# Patient Record
Sex: FEMALE | Race: WHITE | Employment: FULL TIME | URBAN - METROPOLITAN AREA
[De-identification: names, ages, dates, MRNs, and addresses within clinical notes are randomized per-mention and may not be internally consistent; named-entity substitution may affect disease eponyms.]

---

## 2017-01-01 ENCOUNTER — SOCIAL WORK SERVICES (OUTPATIENT)
Dept: HEMATOLOGY/ONCOLOGY | Facility: HOSPITAL | Age: 52
End: 2017-01-01

## 2017-01-01 ENCOUNTER — TELEPHONE (OUTPATIENT)
Dept: HEMATOLOGY/ONCOLOGY | Facility: HOSPITAL | Age: 52
End: 2017-01-01

## 2017-01-01 ENCOUNTER — OFFICE VISIT (OUTPATIENT)
Dept: HEMATOLOGY/ONCOLOGY | Facility: HOSPITAL | Age: 52
End: 2017-01-01
Attending: INTERNAL MEDICINE
Payer: COMMERCIAL

## 2017-01-01 ENCOUNTER — NURSE NAVIGATOR ENCOUNTER (OUTPATIENT)
Dept: HEMATOLOGY/ONCOLOGY | Facility: HOSPITAL | Age: 52
End: 2017-01-01

## 2017-01-01 VITALS
HEART RATE: 87 BPM | RESPIRATION RATE: 18 BRPM | WEIGHT: 120 LBS | HEIGHT: 65 IN | TEMPERATURE: 98 F | OXYGEN SATURATION: 98 % | SYSTOLIC BLOOD PRESSURE: 140 MMHG | BODY MASS INDEX: 19.99 KG/M2 | DIASTOLIC BLOOD PRESSURE: 88 MMHG

## 2017-01-01 VITALS
WEIGHT: 122.5 LBS | OXYGEN SATURATION: 96 % | BODY MASS INDEX: 20.41 KG/M2 | HEIGHT: 65 IN | RESPIRATION RATE: 18 BRPM | TEMPERATURE: 99 F | SYSTOLIC BLOOD PRESSURE: 135 MMHG | DIASTOLIC BLOOD PRESSURE: 80 MMHG | HEART RATE: 86 BPM

## 2017-01-01 VITALS
DIASTOLIC BLOOD PRESSURE: 82 MMHG | TEMPERATURE: 98 F | HEIGHT: 65 IN | RESPIRATION RATE: 18 BRPM | SYSTOLIC BLOOD PRESSURE: 127 MMHG | HEART RATE: 117 BPM | OXYGEN SATURATION: 99 % | WEIGHT: 120.81 LBS | BODY MASS INDEX: 20.13 KG/M2

## 2017-01-01 DIAGNOSIS — K59.09 OTHER CONSTIPATION: ICD-10-CM

## 2017-01-01 DIAGNOSIS — C79.51 BONE METASTASES (HCC): ICD-10-CM

## 2017-01-01 DIAGNOSIS — E83.52 HYPERCALCEMIA: ICD-10-CM

## 2017-01-01 DIAGNOSIS — Z71.9 ENCOUNTER FOR HEALTH EDUCATION: ICD-10-CM

## 2017-01-01 DIAGNOSIS — C50.919 METASTATIC BREAST CANCER (HCC): Primary | ICD-10-CM

## 2017-01-01 DIAGNOSIS — F41.8 ANXIETY ABOUT HEALTH: ICD-10-CM

## 2017-01-01 DIAGNOSIS — G89.3 CANCER RELATED PAIN: ICD-10-CM

## 2017-01-01 DIAGNOSIS — R74.01 TRANSAMINITIS: ICD-10-CM

## 2017-01-01 PROCEDURE — 99245 OFF/OP CONSLTJ NEW/EST HI 55: CPT | Performed by: INTERNAL MEDICINE

## 2017-01-01 PROCEDURE — 99215 OFFICE O/P EST HI 40 MIN: CPT | Performed by: CLINICAL NURSE SPECIALIST

## 2017-01-01 PROCEDURE — 36591 DRAW BLOOD OFF VENOUS DEVICE: CPT

## 2017-01-01 PROCEDURE — 96372 THER/PROPH/DIAG INJ SC/IM: CPT

## 2017-01-01 PROCEDURE — 99215 OFFICE O/P EST HI 40 MIN: CPT | Performed by: INTERNAL MEDICINE

## 2017-01-01 PROCEDURE — 96402 CHEMO HORMON ANTINEOPL SQ/IM: CPT

## 2017-01-01 RX ORDER — DRONABINOL 2.5 MG/1
2.5 CAPSULE ORAL
Qty: 60 CAPSULE | Refills: 0 | Status: SHIPPED | OUTPATIENT
Start: 2017-01-01 | End: 2018-01-01

## 2017-01-01 RX ORDER — PROCHLORPERAZINE MALEATE 10 MG
10 TABLET ORAL EVERY 6 HOURS PRN
Qty: 30 TABLET | Refills: 3 | Status: SHIPPED | OUTPATIENT
Start: 2017-01-01 | End: 2018-01-01

## 2017-01-01 RX ORDER — ONDANSETRON HYDROCHLORIDE 8 MG/1
8 TABLET, FILM COATED ORAL EVERY 8 HOURS PRN
Qty: 30 TABLET | Refills: 3 | Status: SHIPPED | OUTPATIENT
Start: 2017-01-01 | End: 2018-01-01

## 2017-01-01 RX ORDER — LETROZOLE 2.5 MG/1
2.5 TABLET, FILM COATED ORAL DAILY
Qty: 90 TABLET | Refills: 3 | Status: SHIPPED | OUTPATIENT
Start: 2017-01-01 | End: 2018-01-01

## 2017-01-01 RX ORDER — SODIUM CHLORIDE 0.9 % (FLUSH) 0.9 %
10 SYRINGE (ML) INJECTION ONCE
Status: CANCELLED | OUTPATIENT
Start: 2017-01-01

## 2017-01-01 RX ORDER — DIAZEPAM 2 MG/1
2 TABLET ORAL NIGHTLY PRN
COMMUNITY
End: 2018-01-01

## 2017-01-01 RX ORDER — MAGNESIUM OXIDE 400 MG (241.3 MG MAGNESIUM) TABLET
400 TABLET DAILY
COMMUNITY
End: 2018-01-01

## 2017-01-01 RX ORDER — MORPHINE SULFATE 15 MG/1
7.5 TABLET ORAL 2 TIMES DAILY PRN
COMMUNITY
End: 2018-01-01

## 2017-01-01 RX ORDER — SODIUM CHLORIDE 0.9 % (FLUSH) 0.9 %
10 SYRINGE (ML) INJECTION ONCE
Status: COMPLETED | OUTPATIENT
Start: 2017-01-01 | End: 2017-01-01

## 2017-01-01 RX ADMIN — SODIUM CHLORIDE 0.9 % (FLUSH) 10 ML: 0.9 % SYRINGE (ML) INJECTION at 14:10:00

## 2017-01-01 RX ADMIN — SODIUM CHLORIDE 0.9 % (FLUSH) 10 ML: 0.9 % SYRINGE (ML) INJECTION at 09:40:00

## 2017-12-18 PROBLEM — C50.919 METASTATIC BREAST CANCER (HCC): Status: ACTIVE | Noted: 2017-01-01

## 2017-12-18 PROBLEM — K59.09 OTHER CONSTIPATION: Status: ACTIVE | Noted: 2017-01-01

## 2017-12-18 PROBLEM — G89.3 CANCER RELATED PAIN: Status: ACTIVE | Noted: 2017-01-01

## 2017-12-18 PROBLEM — R74.01 TRANSAMINITIS: Status: ACTIVE | Noted: 2017-01-01

## 2017-12-18 PROBLEM — E83.52 HYPERCALCEMIA: Status: ACTIVE | Noted: 2017-01-01

## 2017-12-18 PROBLEM — C79.51 BONE METASTASES (HCC): Status: ACTIVE | Noted: 2017-01-01

## 2017-12-18 PROBLEM — F41.8 ANXIETY ABOUT HEALTH: Status: ACTIVE | Noted: 2017-01-01

## 2017-12-18 NOTE — PROGRESS NOTES
Hematology/Oncology Initial Consultation Note    Patient Name: Sim Chicas  Medical Record Number: [id number]    YOB: 1965   Date of Consultation: 12/18/2017   PCP: Dr. Tabatha Ricardo    Reason for Consultation:  Sim Chicas was seen today for th evaluation of newly diagnosed breast cancer. She had a recent expedited work up performed at [facility].  Her clinical course is summarized as above.   She reports that her last mammogram was 2.5 years ago, at that time was told she had dense breast that time. No known parathyroid disease.      Past Medical History:  Past Medical History:   Diagnosis Date   • Bone metastases ([facility] [address] 12/18/2017   • Metastatic breast cancer ([facility] 12/18/2017     Past Surgical History:  No date: BREAST LUMPECTOMY      Commen Mónica       Family Medical History:  Family History   Problem Relation Age of Onset   • Cancer Father 79     bladder   • Prostate Cancer Father 79   • polycythemia vera [OTHER] Father 80       Review of Systems:  A 10-point ROS was done with pertinent p 6.7 12/18/2017   ALB 3.1 (L) 12/18/2017    12/18/2017   K 4.0 12/18/2017    12/18/2017   CO2 25.0 12/18/2017     No results found for: PTT, PT, INR    Component      Latest Ref Rng & Units 12/18/2017   BREAST CARCINOMA Ag ([id number])      <38.0 u/ *breast/abdominal pain- due to malignancy  -decrease dexamethasone to 4mg AM, 2mg PM- will continue to work to wean this off over the next couple weeks  -continue morphine 7.5mg IR q4hrs, can increase to 15mg if needed as wean dex  -will plan to have h

## 2017-12-18 NOTE — PATIENT INSTRUCTIONS
For Dr. Nhi Rea nurse line, call [phone] with any questions or concerns,  Monday through Friday 8:00 to 4:30. After hours or weekends for urgent needs: [phone].   Central [phone]  Medical Records:   [phone]  Cancer Cent

## 2017-12-18 NOTE — PROGRESS NOTES
Patient here for initial MD visit. Recommended by our patient [employer]. Patient seen previously at [facility].  On 12/4/17 patient went to ER for pain on right side, states had a high fat meal and thought it was her gallbladder.   Testing showe

## 2017-12-21 NOTE — PROGRESS NOTES
Hematology/Oncology Clinic Follow Up Visit    Patient Name: Brooklyn Faith  Medical Record Number: [id number]    YOB: 1965    PCP: Dr. Carrie Rojo    Reason for Consultation:  Brooklyn Faith was seen today for the diagnosis of metastatic breast canc weaned dexamethasone down to 4mg in AM only from BID. Still taking morphine 7.5mg IR q4hrs on a scheduled basis, too afraid pain would worsen if waited longer though often not in any pain when takes each dose.   Sets alarm during night to wake up and take Alcohol use Yes    Comment: rare use, no hx of excessive use    Drug use: No    Sexual activity: Not on file     Other Topics Concern   None on file     Social History Narrative    Single. Lives alone in [place].   Works as an  at [employer]- on 0.93 12/18/2017   CREATSERUM 0.93 12/18/2017   GFR 72 12/18/2017   CA 10.5 (H) 12/18/2017   CA 11.1 (H) 12/18/2017   ALKPHO 560 (H) 12/18/2017    (H) 12/18/2017    (H) 12/18/2017   BILT 0.7 12/18/2017   TP 6.7 12/18/2017   ALB 3.1 (L) 12/18/2 in pain, otherwise stretch duration between doses as able.     -will plan to have her establish with palliative care [facility] next week    *hypercalcemia  -due to malig/bone dz  -tx as below    *bone metastasis  -start xgeva 120mg SC q4 weeks to reduce skeletal

## 2017-12-21 NOTE — PATIENT INSTRUCTIONS
For Dr. Juana Mensah nurse line, call [phone] with any questions or concerns,  Monday through Friday 8:00 to 4:30. After hours or weekends for urgent needs: [phone].   Central [phone]  Medical Records:   [phone]  Cancer Cent

## 2017-12-21 NOTE — PATIENT INSTRUCTIONS
Education Record    Learner:  Patient and Family Member--MOTHER    Disease / Diagnosis:BREAST CANCER    Barriers / Limitations:  None   Comments:    Method:  Brief focused and Reinforcement   Comments:    General Topics:  Medication, Side effects and sympt

## 2017-12-22 NOTE — PROGRESS NOTES
ORAL CHEMOTHERAPY EDUCATION RECORD  Learner:  Patient and mother     Barriers / Limitations: ADHD, anxiety disorder     Diagnosis:   Metastatic Breast Cancer      Medication Name:  Krystal Aguilar       Dose:      125 mg

## 2017-12-27 NOTE — TELEPHONE ENCOUNTER
Patient requesting that at her appt tomorrow she can see the image of her liver. I do not see any imaging results.

## 2017-12-28 PROBLEM — Z51.5 PALLIATIVE CARE BY SPECIALIST: Status: ACTIVE | Noted: 2017-01-01

## 2017-12-28 NOTE — PROGRESS NOTES
Patient here for MD f/u. States feeling well today. Started palbociclib  Patient will be calling Val Cunningham later re: LEONARDO and medical Eri Biswas. Yolanda Edwards informed.      [facility] our pathol

## 2017-12-28 NOTE — PATIENT INSTRUCTIONS
For Dr. María Soliman nurse line, call [phone] with any questions or concerns,  Monday through Friday 8:00 to 4:30. After hours or weekends for urgent needs: [phone].   Central [phone]  Medical Records:   [phone]  Cancer Cent

## 2017-12-28 NOTE — PROGRESS NOTES
Hematology/Oncology Clinic Follow Up Visit    Patient Name: Cristobal Ortiz  Medical Record Number: [id number]    YOB: 1965    PCP: Dr. Toni Morel    Reason for Consultation:  Cristobal Ortiz was seen today for the diagnosis of metastatic breast canc daily    =============================================  Interval events:  Presents for follow up 1 week after starting treatment as above. Energy is up and down. Was taking 2mg AM daily dexamethasone, decreased to 1mg on 12/25.   Tapered morphine IR to onl Years of education: N/A  Number of children: N/A     Occupational History  None on file     Social History Main Topics   Smoking status: Former Smoker     Types: Cigarettes    Smokeless tobacco: Never Used    Comment: smoked socially only, less than 1 pack tenderness    Laboratory:    Lab Results  Component Value Date   WBC 8.0 12/28/2017   WBC 25.3 (H) 12/18/2017   HGB 13.7 12/28/2017   HGB 15.5 12/18/2017   HCT 39.8 12/28/2017   MCV 89.8 12/28/2017   MCH 30.9 12/28/2017   MCHC 34.4 12/28/2017   RDW 14.6 12 scheduled dosing.   -establish with palliative care [facility] today    *hypercalcemia  -due to malig/bone dz  -resolved with xgeva    *bone metastasis  -have started xgeva 120mg SC q4 weeks, will continue.   -monitor BMP, calcium    *transaminitis  -due to infil

## 2017-12-29 NOTE — PROGRESS NOTES
SW spent over an hour with our new patient. SW went over all our support services, [employer], [place] [id number] support services.  THOMAS completed patients Family medical Leave form and faxed it to Nirav Mazariegos at fax # [phone] as requested by Willy Herring

## 2017-12-29 NOTE — PROGRESS NOTES
Palliative Care Follow up Note    Patient Name: Jazmin Beal   YOB: 1965   Medical Record Number: [id number]   CSN: [id number]   Date of visit: 12/28/2017       Chief Complaint/Reason for Visit:  Patient presents with:  Palliative Care: Consult Has family in the area- 2 brothers, 2 sisters, and her mother in [facility]       Medications:    Current Outpatient Prescriptions:   •  Prochlorperazine Maleate (COMPAZINE) 10 mg tablet, Take 1 tablet (10 mg total) by mouth every 6 (six) hours as needed for has many questions and perseverates on her liver mets and overall prognosis. Support provided. Encouraged patient to try sleeping without valium Sleep may improve with steroid taper. Encouraged use of morphine as prn.   Discussed how to use the morphine

## 2017-12-29 NOTE — PROGRESS NOTES
SW completed patients Family medical leave form. This was faxed to [facility] at [address] [facility] fax # [phone] as requested by patient.

## 2017-12-29 NOTE — TELEPHONE ENCOUNTER
Asked to call patient by  because patient stating not feeling well this morning. She states she feels better now after taking morphine 7.5mg and resting.     She decreased her dexamethasone to 1mg and took a quarter of her morphine tablet toda

## 2018-01-01 ENCOUNTER — TELEPHONE (OUTPATIENT)
Dept: HEMATOLOGY/ONCOLOGY | Facility: HOSPITAL | Age: 53
End: 2018-01-01

## 2018-01-01 ENCOUNTER — HOSPITAL ENCOUNTER (INPATIENT)
Facility: HOSPITAL | Age: 53
LOS: 6 days | DRG: 871 | End: 2018-01-01
Attending: HOSPITALIST | Admitting: HOSPITALIST
Payer: OTHER MISCELLANEOUS

## 2018-01-01 ENCOUNTER — OFFICE VISIT (OUTPATIENT)
Dept: HEMATOLOGY/ONCOLOGY | Facility: HOSPITAL | Age: 53
End: 2018-01-01
Attending: INTERNAL MEDICINE
Payer: COMMERCIAL

## 2018-01-01 ENCOUNTER — LAB ENCOUNTER (OUTPATIENT)
Dept: LAB | Facility: HOSPITAL | Age: 53
End: 2018-01-01
Attending: INTERNAL MEDICINE
Payer: COMMERCIAL

## 2018-01-01 ENCOUNTER — DOCUMENTATION ONLY (OUTPATIENT)
Dept: HEMATOLOGY/ONCOLOGY | Facility: HOSPITAL | Age: 53
End: 2018-01-01

## 2018-01-01 ENCOUNTER — MEDICAL CORRESPONDENCE (OUTPATIENT)
Dept: HEMATOLOGY/ONCOLOGY | Facility: HOSPITAL | Age: 53
End: 2018-01-01

## 2018-01-01 ENCOUNTER — SOCIAL WORK SERVICES (OUTPATIENT)
Dept: HEMATOLOGY/ONCOLOGY | Facility: HOSPITAL | Age: 53
End: 2018-01-01

## 2018-01-01 ENCOUNTER — HOSPITAL ENCOUNTER (INPATIENT)
Facility: HOSPITAL | Age: 53
LOS: 4 days | Discharge: HOSPICE | DRG: 871 | End: 2018-01-01
Attending: HOSPITALIST | Admitting: HOSPITALIST
Payer: COMMERCIAL

## 2018-01-01 ENCOUNTER — HOSPITAL ENCOUNTER (OUTPATIENT)
Dept: CT IMAGING | Facility: HOSPITAL | Age: 53
Discharge: HOME | End: 2018-01-01
Attending: INTERNAL MEDICINE
Payer: COMMERCIAL

## 2018-01-01 ENCOUNTER — APPOINTMENT (OUTPATIENT)
Dept: GENERAL RADIOLOGY | Facility: HOSPITAL | Age: 53
DRG: 871 | End: 2018-01-01
Attending: INTERNAL MEDICINE
Payer: COMMERCIAL

## 2018-01-01 VITALS
HEIGHT: 62.24 IN | DIASTOLIC BLOOD PRESSURE: 72 MMHG | TEMPERATURE: 99 F | WEIGHT: 116 LBS | HEART RATE: 108 BPM | SYSTOLIC BLOOD PRESSURE: 117 MMHG | BODY MASS INDEX: 21.08 KG/M2 | RESPIRATION RATE: 18 BRPM

## 2018-01-01 VITALS
TEMPERATURE: 98 F | WEIGHT: 115.63 LBS | DIASTOLIC BLOOD PRESSURE: 86 MMHG | HEART RATE: 108 BPM | OXYGEN SATURATION: 99 % | HEIGHT: 62.21 IN | SYSTOLIC BLOOD PRESSURE: 119 MMHG | RESPIRATION RATE: 18 BRPM | BODY MASS INDEX: 21.01 KG/M2

## 2018-01-01 VITALS
OXYGEN SATURATION: 98 % | HEIGHT: 62.24 IN | RESPIRATION RATE: 18 BRPM | DIASTOLIC BLOOD PRESSURE: 78 MMHG | WEIGHT: 112 LBS | SYSTOLIC BLOOD PRESSURE: 125 MMHG | TEMPERATURE: 100 F | BODY MASS INDEX: 20.35 KG/M2 | HEART RATE: 121 BPM

## 2018-01-01 VITALS
DIASTOLIC BLOOD PRESSURE: 72 MMHG | BODY MASS INDEX: 20.2 KG/M2 | HEART RATE: 90 BPM | TEMPERATURE: 99 F | OXYGEN SATURATION: 97 % | RESPIRATION RATE: 18 BRPM | WEIGHT: 111.19 LBS | HEIGHT: 62.24 IN | SYSTOLIC BLOOD PRESSURE: 106 MMHG

## 2018-01-01 VITALS
RESPIRATION RATE: 20 BRPM | OXYGEN SATURATION: 99 % | HEART RATE: 99 BPM | BODY MASS INDEX: 21 KG/M2 | TEMPERATURE: 99 F | WEIGHT: 114.81 LBS | DIASTOLIC BLOOD PRESSURE: 82 MMHG | SYSTOLIC BLOOD PRESSURE: 125 MMHG

## 2018-01-01 VITALS — DIASTOLIC BLOOD PRESSURE: 25 MMHG | TEMPERATURE: 98 F | OXYGEN SATURATION: 59 % | SYSTOLIC BLOOD PRESSURE: 56 MMHG

## 2018-01-01 VITALS
OXYGEN SATURATION: 99 % | BODY MASS INDEX: 22.02 KG/M2 | WEIGHT: 121.19 LBS | RESPIRATION RATE: 18 BRPM | HEART RATE: 121 BPM | HEIGHT: 62.21 IN | SYSTOLIC BLOOD PRESSURE: 136 MMHG | DIASTOLIC BLOOD PRESSURE: 76 MMHG | TEMPERATURE: 99 F

## 2018-01-01 VITALS
DIASTOLIC BLOOD PRESSURE: 68 MMHG | BODY MASS INDEX: 21.33 KG/M2 | TEMPERATURE: 100 F | HEART RATE: 101 BPM | HEIGHT: 62.21 IN | SYSTOLIC BLOOD PRESSURE: 97 MMHG | WEIGHT: 117.38 LBS | RESPIRATION RATE: 18 BRPM

## 2018-01-01 VITALS
HEIGHT: 62.24 IN | WEIGHT: 113.38 LBS | DIASTOLIC BLOOD PRESSURE: 68 MMHG | BODY MASS INDEX: 20.6 KG/M2 | HEART RATE: 101 BPM | SYSTOLIC BLOOD PRESSURE: 116 MMHG | RESPIRATION RATE: 18 BRPM | TEMPERATURE: 100 F

## 2018-01-01 VITALS
OXYGEN SATURATION: 76 % | RESPIRATION RATE: 29 BRPM | WEIGHT: 127.63 LBS | TEMPERATURE: 99 F | BODY MASS INDEX: 23.49 KG/M2 | DIASTOLIC BLOOD PRESSURE: 47 MMHG | HEART RATE: 100 BPM | HEIGHT: 62 IN | SYSTOLIC BLOOD PRESSURE: 107 MMHG

## 2018-01-01 DIAGNOSIS — R60.0 BILATERAL LEG EDEMA: Primary | ICD-10-CM

## 2018-01-01 DIAGNOSIS — T45.1X5A CHEMOTHERAPY INDUCED NEUTROPENIA (HCC): ICD-10-CM

## 2018-01-01 DIAGNOSIS — R74.01 TRANSAMINITIS: ICD-10-CM

## 2018-01-01 DIAGNOSIS — E80.6 HYPERBILIRUBINEMIA: ICD-10-CM

## 2018-01-01 DIAGNOSIS — D70.1 CHEMOTHERAPY INDUCED NEUTROPENIA (HCC): ICD-10-CM

## 2018-01-01 DIAGNOSIS — L27.0 ALLERGIC DRUG RASH: ICD-10-CM

## 2018-01-01 DIAGNOSIS — K12.30 STOMATITIS AND MUCOSITIS: ICD-10-CM

## 2018-01-01 DIAGNOSIS — T40.2X5A CONSTIPATION DUE TO OPIOID THERAPY: ICD-10-CM

## 2018-01-01 DIAGNOSIS — F41.8 ANXIETY ABOUT HEALTH: ICD-10-CM

## 2018-01-01 DIAGNOSIS — C79.51 BONE METASTASES (HCC): ICD-10-CM

## 2018-01-01 DIAGNOSIS — T45.1X5A CHEMOTHERAPY INDUCED DIARRHEA: ICD-10-CM

## 2018-01-01 DIAGNOSIS — E87.6 HYPOKALEMIA: ICD-10-CM

## 2018-01-01 DIAGNOSIS — G89.3 NEOPLASM RELATED PAIN: ICD-10-CM

## 2018-01-01 DIAGNOSIS — C50.919 METASTATIC BREAST CANCER (HCC): Primary | ICD-10-CM

## 2018-01-01 DIAGNOSIS — K12.31 MUCOSITIS DUE TO CHEMOTHERAPY: ICD-10-CM

## 2018-01-01 DIAGNOSIS — D64.81 ANEMIA ASSOCIATED WITH CHEMOTHERAPY: ICD-10-CM

## 2018-01-01 DIAGNOSIS — K59.03 CONSTIPATION DUE TO OPIOID THERAPY: ICD-10-CM

## 2018-01-01 DIAGNOSIS — R39.15 URINARY URGENCY: ICD-10-CM

## 2018-01-01 DIAGNOSIS — R30.0 DYSURIA: ICD-10-CM

## 2018-01-01 DIAGNOSIS — T45.1X5A CHEMOTHERAPY INDUCED NEUTROPENIA (HCC): Primary | ICD-10-CM

## 2018-01-01 DIAGNOSIS — F41.9 ANXIETY: ICD-10-CM

## 2018-01-01 DIAGNOSIS — D70.1 CHEMOTHERAPY INDUCED NEUTROPENIA (HCC): Primary | ICD-10-CM

## 2018-01-01 DIAGNOSIS — G89.3 CANCER RELATED PAIN: ICD-10-CM

## 2018-01-01 DIAGNOSIS — T45.1X5A ANEMIA ASSOCIATED WITH CHEMOTHERAPY: ICD-10-CM

## 2018-01-01 DIAGNOSIS — R52 PAIN: ICD-10-CM

## 2018-01-01 DIAGNOSIS — Z71.89 OTHER SPECIFIED COUNSELING: ICD-10-CM

## 2018-01-01 DIAGNOSIS — K12.1 STOMATITIS: ICD-10-CM

## 2018-01-01 DIAGNOSIS — K12.30 MUCOSITIS: Primary | ICD-10-CM

## 2018-01-01 DIAGNOSIS — R63.0 LACK OF APPETITE: ICD-10-CM

## 2018-01-01 DIAGNOSIS — K52.1 CHEMOTHERAPY INDUCED DIARRHEA: ICD-10-CM

## 2018-01-01 DIAGNOSIS — D69.6 THROMBOCYTOPENIA (HCC): ICD-10-CM

## 2018-01-01 DIAGNOSIS — R50.9 FEVER IN ADULT: Primary | ICD-10-CM

## 2018-01-01 DIAGNOSIS — K52.1 DIARRHEA DUE TO DRUG: ICD-10-CM

## 2018-01-01 DIAGNOSIS — R50.9 FEVER IN ADULT: ICD-10-CM

## 2018-01-01 DIAGNOSIS — K12.1 STOMATITIS AND MUCOSITIS: ICD-10-CM

## 2018-01-01 DIAGNOSIS — C79.9 METASTATIC CARCINOMA (HCC): Primary | ICD-10-CM

## 2018-01-01 DIAGNOSIS — R11.0 NAUSEA: ICD-10-CM

## 2018-01-01 DIAGNOSIS — R05.3 PERSISTENT DRY COUGH: ICD-10-CM

## 2018-01-01 DIAGNOSIS — E86.0 DEHYDRATION: ICD-10-CM

## 2018-01-01 DIAGNOSIS — G89.3 NEOPLASM RELATED PAIN: Primary | ICD-10-CM

## 2018-01-01 DIAGNOSIS — K59.09 OTHER CONSTIPATION: ICD-10-CM

## 2018-01-01 DIAGNOSIS — R23.4 PEELING SKIN: ICD-10-CM

## 2018-01-01 DIAGNOSIS — R50.9 FEVER: ICD-10-CM

## 2018-01-01 DIAGNOSIS — R53.83 FATIGUE DUE TO TREATMENT: ICD-10-CM

## 2018-01-01 DIAGNOSIS — C50.919 METASTATIC BREAST CANCER (HCC): ICD-10-CM

## 2018-01-01 DIAGNOSIS — R30.0 DYSURIA: Primary | ICD-10-CM

## 2018-01-01 LAB
ADENOVIRUS PCR:: NEGATIVE
ALBUMIN SERPL-MCNC: 1.3 G/DL (ref 3.5–4.8)
ALBUMIN SERPL-MCNC: 1.5 G/DL (ref 3.5–4.8)
ALBUMIN SERPL-MCNC: 1.6 G/DL (ref 3.5–4.8)
ALBUMIN SERPL-MCNC: 2.1 G/DL (ref 3.5–4.8)
ALBUMIN SERPL-MCNC: 2.1 G/DL (ref 3.5–4.8)
ALBUMIN SERPL-MCNC: 2.2 G/DL (ref 3.5–4.8)
ALBUMIN SERPL-MCNC: 2.4 G/DL (ref 3.5–4.8)
ALBUMIN SERPL-MCNC: 2.7 G/DL (ref 3.5–4.8)
ALBUMIN SERPL-MCNC: 3 G/DL (ref 3.5–4.8)
ALLENS TEST: POSITIVE
ALP LIVER SERPL-CCNC: 386 U/L (ref 39–100)
ALP LIVER SERPL-CCNC: 486 U/L (ref 39–100)
ALP LIVER SERPL-CCNC: 515 U/L (ref 39–100)
ALP LIVER SERPL-CCNC: 540 U/L (ref 39–100)
ALP LIVER SERPL-CCNC: 557 U/L (ref 39–100)
ALP LIVER SERPL-CCNC: 588 U/L (ref 39–100)
ALP LIVER SERPL-CCNC: 593 U/L (ref 39–100)
ALP LIVER SERPL-CCNC: 671 U/L (ref 39–100)
ALP LIVER SERPL-CCNC: 727 U/L (ref 39–100)
ALT SERPL-CCNC: 105 U/L (ref 14–54)
ALT SERPL-CCNC: 155 U/L (ref 14–54)
ALT SERPL-CCNC: 156 U/L (ref 14–54)
ALT SERPL-CCNC: 198 U/L (ref 14–54)
ALT SERPL-CCNC: 224 U/L (ref 14–54)
ALT SERPL-CCNC: 320 U/L (ref 14–54)
ALT SERPL-CCNC: 72 U/L (ref 14–54)
ALT SERPL-CCNC: 85 U/L (ref 14–54)
ALT SERPL-CCNC: 87 U/L (ref 14–54)
APTT PPP: 26.4 SECONDS (ref 25–34)
ARTERIAL BLD GAS O2 SATURATION: 87 % (ref 92–100)
ARTERIAL BLOOD GAS BASE EXCESS: -5.8
ARTERIAL BLOOD GAS HCO3: 17.7 MEQ/L (ref 22–26)
ARTERIAL BLOOD GAS PCO2: 26 MM HG (ref 35–45)
ARTERIAL BLOOD GAS PH: 7.44 (ref 7.35–7.45)
ARTERIAL BLOOD GAS PO2: 48 MM HG (ref 80–105)
AST SERPL-CCNC: 107 U/L (ref 15–41)
AST SERPL-CCNC: 112 U/L (ref 15–41)
AST SERPL-CCNC: 185 U/L (ref 15–41)
AST SERPL-CCNC: 187 U/L (ref 15–41)
AST SERPL-CCNC: 216 U/L (ref 15–41)
AST SERPL-CCNC: 353 U/L (ref 15–41)
AST SERPL-CCNC: 76 U/L (ref 15–41)
AST SERPL-CCNC: 76 U/L (ref 15–41)
AST SERPL-CCNC: 86 U/L (ref 15–41)
B PERT DNA SPEC QL NAA+PROBE: NEGATIVE
BAND %: 1 %
BAND %: 11 %
BAND %: 11 %
BAND %: 6 %
BAND %: 9 %
BASOPHIL % MANUAL: 0 %
BASOPHIL ABSOLUTE MANUAL: 0 X10(3) UL (ref 0–0.1)
BASOPHILS # BLD AUTO: 0 X10(3) UL (ref 0–0.1)
BASOPHILS # BLD AUTO: 0 X10(3) UL (ref 0–0.1)
BASOPHILS # BLD AUTO: 0.01 X10(3) UL (ref 0–0.1)
BASOPHILS # BLD AUTO: 0.02 X10(3) UL (ref 0–0.1)
BASOPHILS NFR BLD AUTO: 0 %
BASOPHILS NFR BLD AUTO: 0 %
BASOPHILS NFR BLD AUTO: 0.3 %
BASOPHILS NFR BLD AUTO: 0.5 %
BILIRUB DIRECT SERPL-MCNC: 1.8 MG/DL (ref 0.1–0.5)
BILIRUB DIRECT SERPL-MCNC: 2.5 MG/DL (ref 0.1–0.5)
BILIRUB SERPL-MCNC: 1 MG/DL (ref 0.1–2)
BILIRUB SERPL-MCNC: 1.8 MG/DL (ref 0.1–2)
BILIRUB SERPL-MCNC: 1.8 MG/DL (ref 0.1–2)
BILIRUB SERPL-MCNC: 2.5 MG/DL (ref 0.1–2)
BILIRUB SERPL-MCNC: 2.6 MG/DL (ref 0.1–2)
BILIRUB SERPL-MCNC: 2.6 MG/DL (ref 0.1–2)
BILIRUB SERPL-MCNC: 2.7 MG/DL (ref 0.1–2)
BILIRUB SERPL-MCNC: 2.8 MG/DL (ref 0.1–2)
BILIRUB SERPL-MCNC: 3.2 MG/DL (ref 0.1–2)
BILIRUB UR QL STRIP.AUTO: NEGATIVE
BREAST CARCINOMA AG (CA2729): 434.1 U/ML (ref ?–38)
BREAST CARCINOMA AG (CA2729): 712.9 U/ML (ref ?–38)
BREAST CARCINOMA AG (CA2729): 800.1 U/ML (ref ?–38)
BREAST CARCINOMA AG (CA2729): 967.9 U/ML (ref ?–38)
BUN BLD-MCNC: 12 MG/DL (ref 8–20)
BUN BLD-MCNC: 12 MG/DL (ref 8–20)
BUN BLD-MCNC: 15 MG/DL (ref 8–20)
BUN BLD-MCNC: 16 MG/DL (ref 8–20)
BUN BLD-MCNC: 20 MG/DL (ref 8–20)
BUN BLD-MCNC: 6 MG/DL (ref 8–20)
BUN BLD-MCNC: 6 MG/DL (ref 8–20)
BUN BLD-MCNC: 7 MG/DL (ref 8–20)
BUN BLD-MCNC: 7 MG/DL (ref 8–20)
C PNEUM DNA SPEC QL NAA+PROBE: NEGATIVE
CALCIUM BLD-MCNC: 6.4 MG/DL (ref 8.3–10.3)
CALCIUM BLD-MCNC: 7.1 MG/DL (ref 8.3–10.3)
CALCIUM BLD-MCNC: 7.2 MG/DL (ref 8.3–10.3)
CALCIUM BLD-MCNC: 7.9 MG/DL (ref 8.3–10.3)
CALCIUM BLD-MCNC: 8 MG/DL (ref 8.3–10.3)
CALCIUM BLD-MCNC: 8 MG/DL (ref 8.3–10.3)
CALCIUM BLD-MCNC: 8.2 MG/DL (ref 8.3–10.3)
CALCIUM BLD-MCNC: 8.5 MG/DL (ref 8.3–10.3)
CALCIUM BLD-MCNC: 9.2 MG/DL (ref 8.3–10.3)
CALCULATED O2 SATURATION: 85 % (ref 92–100)
CANCER AG 15-3 (CA15-3): 831.9 U/ML (ref ?–30)
CARBOXYHEMOGLOBIN: 1 % SAT (ref 0–3)
CEA: 476.8 NG/ML (ref 0.5–5)
CEA: 827.1 NG/ML (ref 0.5–5)
CEA: 879.4 NG/ML (ref 0.5–5)
CHLORIDE: 100 MMOL/L (ref 101–111)
CHLORIDE: 101 MMOL/L (ref 101–111)
CHLORIDE: 103 MMOL/L (ref 101–111)
CHLORIDE: 104 MMOL/L (ref 101–111)
CHLORIDE: 105 MMOL/L (ref 101–111)
CHLORIDE: 105 MMOL/L (ref 101–111)
CHLORIDE: 108 MMOL/L (ref 101–111)
CHLORIDE: 113 MMOL/L (ref 101–111)
CHLORIDE: 94 MMOL/L (ref 101–111)
CLARITY UR REFRACT.AUTO: CLEAR
CLARITY UR REFRACT.AUTO: CLEAR
CO2: 21 MMOL/L (ref 22–32)
CO2: 22 MMOL/L (ref 22–32)
CO2: 23 MMOL/L (ref 22–32)
CO2: 23 MMOL/L (ref 22–32)
CO2: 24 MMOL/L (ref 22–32)
CO2: 25 MMOL/L (ref 22–32)
COLOR UR AUTO: YELLOW
CORONAVIRUS 229E PCR:: NEGATIVE
CORONAVIRUS HKU1 PCR:: NEGATIVE
CORONAVIRUS NL63 PCR:: NEGATIVE
CORONAVIRUS OC43 PCR:: NEGATIVE
CREAT BLD-MCNC: 0.28 MG/DL (ref 0.55–1.02)
CREAT BLD-MCNC: 0.29 MG/DL (ref 0.55–1.02)
CREAT BLD-MCNC: 0.36 MG/DL (ref 0.55–1.02)
CREAT BLD-MCNC: 0.48 MG/DL (ref 0.55–1.02)
CREAT BLD-MCNC: 0.52 MG/DL (ref 0.55–1.02)
CREAT BLD-MCNC: 0.52 MG/DL (ref 0.55–1.02)
CREAT BLD-MCNC: 0.59 MG/DL (ref 0.55–1.02)
CREAT BLD-MCNC: 0.75 MG/DL (ref 0.55–1.02)
CREAT BLD-MCNC: 0.81 MG/DL (ref 0.55–1.02)
EOSINOPHIL # BLD AUTO: 0 X10(3) UL (ref 0–0.3)
EOSINOPHIL # BLD AUTO: 0.01 X10(3) UL (ref 0–0.3)
EOSINOPHIL % MANUAL: 0 %
EOSINOPHIL ABSOLUTE MANUAL: 0 X10(3) UL (ref 0–0.3)
EOSINOPHIL NFR BLD AUTO: 0 %
EOSINOPHIL NFR BLD AUTO: 0.3 %
ERYTHROCYTE [DISTWIDTH] IN BLOOD BY AUTOMATED COUNT: 15.7 % (ref 11.5–16)
ERYTHROCYTE [DISTWIDTH] IN BLOOD BY AUTOMATED COUNT: 15.8 % (ref 11.5–16)
ERYTHROCYTE [DISTWIDTH] IN BLOOD BY AUTOMATED COUNT: 18.5 % (ref 11.5–16)
ERYTHROCYTE [DISTWIDTH] IN BLOOD BY AUTOMATED COUNT: 18.8 % (ref 11.5–16)
ERYTHROCYTE [DISTWIDTH] IN BLOOD BY AUTOMATED COUNT: 19.1 % (ref 11.5–16)
ERYTHROCYTE [DISTWIDTH] IN BLOOD BY AUTOMATED COUNT: 19.3 % (ref 11.5–16)
ERYTHROCYTE [DISTWIDTH] IN BLOOD BY AUTOMATED COUNT: 20.4 % (ref 11.5–16)
FLUAV RNA SPEC QL NAA+PROBE: NEGATIVE
FLUBV RNA SPEC QL NAA+PROBE: NEGATIVE
GAMMA GLUTAMYL TRANSFERASE: >1600 U/L (ref 5–55)
GLUCOSE BLD-MCNC: 100 MG/DL (ref 70–99)
GLUCOSE BLD-MCNC: 142 MG/DL (ref 70–99)
GLUCOSE BLD-MCNC: 152 MG/DL (ref 70–99)
GLUCOSE BLD-MCNC: 86 MG/DL (ref 70–99)
GLUCOSE BLD-MCNC: 87 MG/DL (ref 70–99)
GLUCOSE BLD-MCNC: 90 MG/DL (ref 70–99)
GLUCOSE BLD-MCNC: 98 MG/DL (ref 70–99)
GLUCOSE BLD-MCNC: 98 MG/DL (ref 70–99)
GLUCOSE BLD-MCNC: 99 MG/DL (ref 70–99)
GLUCOSE UR STRIP.AUTO-MCNC: NEGATIVE MG/DL
HAV IGM SER QL: 1.8 MG/DL (ref 1.7–3)
HAV IGM SER QL: 1.8 MG/DL (ref 1.7–3)
HAV IGM SER QL: 2.1 MG/DL (ref 1.7–3)
HAV IGM SER QL: 2.4 MG/DL (ref 1.7–3)
HCT VFR BLD AUTO: 21.1 % (ref 34–50)
HCT VFR BLD AUTO: 21.1 % (ref 34–50)
HCT VFR BLD AUTO: 22.6 % (ref 34–50)
HCT VFR BLD AUTO: 25.8 % (ref 34–50)
HCT VFR BLD AUTO: 27.3 % (ref 34–50)
HCT VFR BLD AUTO: 30.8 % (ref 34–50)
HCT VFR BLD AUTO: 31 % (ref 34–50)
HCT VFR BLD AUTO: 35.9 % (ref 34–50)
HCT VFR BLD AUTO: 37.3 % (ref 34–50)
HGB BLD-MCNC: 10.9 G/DL (ref 12–16)
HGB BLD-MCNC: 11 G/DL (ref 12–16)
HGB BLD-MCNC: 12.4 G/DL (ref 12–16)
HGB BLD-MCNC: 13 G/DL (ref 12–16)
HGB BLD-MCNC: 7.5 G/DL (ref 12–16)
HGB BLD-MCNC: 7.5 G/DL (ref 12–16)
HGB BLD-MCNC: 8 G/DL (ref 12–16)
HGB BLD-MCNC: 9.2 G/DL (ref 12–16)
HGB BLD-MCNC: 9.6 G/DL (ref 12–16)
IMMATURE GRANULOCYTE COUNT: 0 X10(3) UL (ref 0–1)
IMMATURE GRANULOCYTE COUNT: 0 X10(3) UL (ref 0–1)
IMMATURE GRANULOCYTE COUNT: 0.01 X10(3) UL (ref 0–1)
IMMATURE GRANULOCYTE COUNT: 0.02 X10(3) UL (ref 0–1)
IMMATURE GRANULOCYTE RATIO %: 0 %
IMMATURE GRANULOCYTE RATIO %: 0 %
IMMATURE GRANULOCYTE RATIO %: 0.5 %
IMMATURE GRANULOCYTE RATIO %: 0.5 %
INR BLD: 0.94 (ref 0.89–1.11)
KETONES UR STRIP.AUTO-MCNC: 80 MG/DL
KETONES UR STRIP.AUTO-MCNC: NEGATIVE MG/DL
KETONES UR STRIP.AUTO-MCNC: NEGATIVE MG/DL
L/M: 6 L/MIN
LACTIC ACID: 0.5 MMOL/L (ref 0.5–2)
LACTIC ACID: 0.5 MMOL/L (ref 0.5–2)
LACTIC ACID: 0.6 MMOL/L (ref 0.5–2)
LACTIC ACID: 0.8 MMOL/L (ref 0.5–2)
LACTIC ACID: 1.1 MMOL/L (ref 0.5–2)
LDH: 524 U/L (ref 84–246)
LDH: 703 U/L (ref 84–246)
LDH: 783 U/L (ref 84–246)
LDH: 949 U/L (ref 84–246)
LEUKOCYTE ESTERASE UR QL STRIP.AUTO: NEGATIVE
LYMPHOCYTE % MANUAL: 12 %
LYMPHOCYTE % MANUAL: 16 %
LYMPHOCYTE % MANUAL: 20 %
LYMPHOCYTE % MANUAL: 30 %
LYMPHOCYTE % MANUAL: 8 %
LYMPHOCYTE ABSOLUTE MANUAL: 1.36 X10(3) UL (ref 0.9–4)
LYMPHOCYTE ABSOLUTE MANUAL: 1.66 X10(3) UL (ref 0.9–4)
LYMPHOCYTE ABSOLUTE MANUAL: 1.89 X10(3) UL (ref 0.9–4)
LYMPHOCYTE ABSOLUTE MANUAL: 2.8 X10(3) UL (ref 0.9–4)
LYMPHOCYTE ABSOLUTE MANUAL: 3.03 X10(3) UL (ref 0.9–4)
LYMPHOCYTES # BLD AUTO: 0.25 X10(3) UL (ref 0.9–4)
LYMPHOCYTES # BLD AUTO: 1.41 X10(3) UL (ref 0.9–4)
LYMPHOCYTES # BLD AUTO: 2.48 X10(3) UL (ref 0.9–4)
LYMPHOCYTES # BLD AUTO: 2.92 X10(3) UL (ref 0.9–4)
LYMPHOCYTES NFR BLD AUTO: 63.8 %
LYMPHOCYTES NFR BLD AUTO: 74.2 %
LYMPHOCYTES NFR BLD AUTO: 83.2 %
LYMPHOCYTES NFR BLD AUTO: 92.6 %
M PROTEIN MFR SERPL ELPH: 3.8 G/DL (ref 6.1–8.3)
M PROTEIN MFR SERPL ELPH: 4.2 G/DL (ref 6.1–8.3)
M PROTEIN MFR SERPL ELPH: 4.4 G/DL (ref 6.1–8.3)
M PROTEIN MFR SERPL ELPH: 5.3 G/DL (ref 6.1–8.3)
M PROTEIN MFR SERPL ELPH: 5.5 G/DL (ref 6.1–8.3)
M PROTEIN MFR SERPL ELPH: 5.6 G/DL (ref 6.1–8.3)
M PROTEIN MFR SERPL ELPH: 5.7 G/DL (ref 6.1–8.3)
M PROTEIN MFR SERPL ELPH: 5.9 G/DL (ref 6.1–8.3)
M PROTEIN MFR SERPL ELPH: 6.1 G/DL (ref 6.1–8.3)
MCH RBC QN AUTO: 30.2 PG (ref 27–33.2)
MCH RBC QN AUTO: 30.6 PG (ref 27–33.2)
MCH RBC QN AUTO: 31 PG (ref 27–33.2)
MCH RBC QN AUTO: 31.1 PG (ref 27–33.2)
MCH RBC QN AUTO: 31.2 PG (ref 27–33.2)
MCH RBC QN AUTO: 31.3 PG (ref 27–33.2)
MCH RBC QN AUTO: 31.3 PG (ref 27–33.2)
MCH RBC QN AUTO: 31.4 PG (ref 27–33.2)
MCH RBC QN AUTO: 31.6 PG (ref 27–33.2)
MCHC RBC AUTO-ENTMCNC: 34.5 G/DL (ref 31–37)
MCHC RBC AUTO-ENTMCNC: 34.9 G/DL (ref 31–37)
MCHC RBC AUTO-ENTMCNC: 35.2 G/DL (ref 31–37)
MCHC RBC AUTO-ENTMCNC: 35.4 G/DL (ref 31–37)
MCHC RBC AUTO-ENTMCNC: 35.4 G/DL (ref 31–37)
MCHC RBC AUTO-ENTMCNC: 35.5 G/DL (ref 31–37)
MCHC RBC AUTO-ENTMCNC: 35.7 G/DL (ref 31–37)
MCV RBC AUTO: 85.1 FL (ref 81–100)
MCV RBC AUTO: 86.1 FL (ref 81–100)
MCV RBC AUTO: 87.8 FL (ref 81–100)
MCV RBC AUTO: 88.3 FL (ref 81–100)
MCV RBC AUTO: 88.3 FL (ref 81–100)
MCV RBC AUTO: 88.6 FL (ref 81–100)
MCV RBC AUTO: 88.8 FL (ref 81–100)
MCV RBC AUTO: 89.3 FL (ref 81–100)
MCV RBC AUTO: 90.2 FL (ref 81–100)
METAMYELOCYTE %: 10 %
METAMYELOCYTE %: 11 %
METAMYELOCYTE %: 11 %
METAMYELOCYTE %: 2 %
METAMYELOCYTE ABSOLUTE MANUAL: 0.47 X10(3) UL (ref ?–0.01)
METAMYELOCYTE ABSOLUTE MANUAL: 0.75 X10(3) UL (ref ?–0.01)
METAMYELOCYTE ABSOLUTE MANUAL: 0.99 X10(3) UL (ref ?–0.01)
METAMYELOCYTE ABSOLUTE MANUAL: 2.56 X10(3) UL (ref ?–0.01)
METAPNEUMOVIRUS PCR:: NEGATIVE
METHEMOGLOBIN: 0.4 % SAT (ref 0.4–1.5)
MONOCYTE % MANUAL: 10 %
MONOCYTE % MANUAL: 22 %
MONOCYTE % MANUAL: 24 %
MONOCYTE % MANUAL: 5 %
MONOCYTE % MANUAL: 8 %
MONOCYTE ABSOLUTE MANUAL: 0.81 X10(3) UL (ref 0.1–0.6)
MONOCYTE ABSOLUTE MANUAL: 1.18 X10(3) UL (ref 0.1–0.6)
MONOCYTE ABSOLUTE MANUAL: 1.63 X10(3) UL (ref 0.1–0.6)
MONOCYTE ABSOLUTE MANUAL: 2.29 X10(3) UL (ref 0.1–0.6)
MONOCYTE ABSOLUTE MANUAL: 2.33 X10(3) UL (ref 0.1–0.6)
MONOCYTES # BLD AUTO: 0.01 X10(3) UL (ref 0.1–0.6)
MONOCYTES # BLD AUTO: 0.01 X10(3) UL (ref 0.1–0.6)
MONOCYTES # BLD AUTO: 0.05 X10(3) UL (ref 0.1–0.6)
MONOCYTES # BLD AUTO: 0.22 X10(3) UL (ref 0.1–0.6)
MONOCYTES NFR BLD AUTO: 0.5 %
MONOCYTES NFR BLD AUTO: 1.3 %
MONOCYTES NFR BLD AUTO: 3.7 %
MONOCYTES NFR BLD AUTO: 6.3 %
MORPHOLOGY: NORMAL
MYCOPLASMA PNEUMONIA PCR:: NEGATIVE
MYELOCYTE %: 16 %
MYELOCYTE %: 2 %
MYELOCYTE %: 20 %
MYELOCYTE %: 21 %
MYELOCYTE ABSOLUTE MANUAL: 0.47 X10(3) UL (ref ?–0.01)
MYELOCYTE ABSOLUTE MANUAL: 1.36 X10(3) UL (ref ?–0.01)
MYELOCYTE ABSOLUTE MANUAL: 2.18 X10(3) UL (ref ?–0.01)
MYELOCYTE ABSOLUTE MANUAL: 3.73 X10(3) UL (ref ?–0.01)
NEUTROPHIL ABS PRELIM: 0.01 X10 (3) UL (ref 1.3–6.7)
NEUTROPHIL ABS PRELIM: 0.36 X10 (3) UL (ref 1.3–6.7)
NEUTROPHIL ABS PRELIM: 0.47 X10 (3) UL (ref 1.3–6.7)
NEUTROPHIL ABS PRELIM: 1.31 X10 (3) UL (ref 1.3–6.7)
NEUTROPHIL ABS PRELIM: 13.97 X10 (3) UL (ref 1.3–6.7)
NEUTROPHIL ABS PRELIM: 14.26 X10 (3) UL (ref 1.3–6.7)
NEUTROPHIL ABS PRELIM: 3.79 X10 (3) UL (ref 1.3–6.7)
NEUTROPHIL ABS PRELIM: 5.35 X10 (3) UL (ref 1.3–6.7)
NEUTROPHIL ABS PRELIM: 5.73 X10 (3) UL (ref 1.3–6.7)
NEUTROPHIL ABSOLUTE MANUAL: 1.56 X10(3) UL (ref 1.3–6.7)
NEUTROPHIL ABSOLUTE MANUAL: 11.42 X10(3) UL (ref 1.3–6.7)
NEUTROPHIL ABSOLUTE MANUAL: 19.59 X10(3) UL (ref 1.3–6.7)
NEUTROPHIL ABSOLUTE MANUAL: 2.86 X10(3) UL (ref 1.3–6.7)
NEUTROPHIL ABSOLUTE MANUAL: 6.26 X10(3) UL (ref 1.3–6.7)
NEUTROPHILS # BLD AUTO: 0.01 X10(3) UL (ref 1.3–6.7)
NEUTROPHILS # BLD AUTO: 0.36 X10(3) UL (ref 1.3–6.7)
NEUTROPHILS # BLD AUTO: 0.47 X10(3) UL (ref 1.3–6.7)
NEUTROPHILS # BLD AUTO: 1.31 X10(3) UL (ref 1.3–6.7)
NEUTROPHILS % MANUAL: 17 %
NEUTROPHILS % MANUAL: 22 %
NEUTROPHILS % MANUAL: 40 %
NEUTROPHILS % MANUAL: 56 %
NEUTROPHILS % MANUAL: 72 %
NEUTROPHILS NFR BLD AUTO: 10.2 %
NEUTROPHILS NFR BLD AUTO: 24.8 %
NEUTROPHILS NFR BLD AUTO: 3.7 %
NEUTROPHILS NFR BLD AUTO: 33.6 %
NITRITE UR QL STRIP.AUTO: NEGATIVE
NITRITE UR QL STRIP.AUTO: NEGATIVE
NITRITE UR QL STRIP.AUTO: POSITIVE
NRBC CALCULATED: 1
NRBC CALCULATED: 2
NRBC CALCULATED: 3
PARAINFLUENZA 1 PCR:: NEGATIVE
PARAINFLUENZA 2 PCR:: NEGATIVE
PARAINFLUENZA 3 PCR:: NEGATIVE
PARAINFLUENZA 4 PCR:: NEGATIVE
PATIENT TEMPERATURE: 98 F
PH UR STRIP.AUTO: 5 [PH] (ref 4.5–8)
PH UR STRIP.AUTO: 6 [PH] (ref 4.5–8)
PH UR STRIP.AUTO: 6 [PH] (ref 4.5–8)
PHOSPHATE SERPL-MCNC: 0.5 MG/DL (ref 2.5–4.9)
PHOSPHATE SERPL-MCNC: 1.5 MG/DL (ref 2.5–4.9)
PLATELET # BLD AUTO: 134 10(3)UL (ref 150–450)
PLATELET # BLD AUTO: 147 10(3)UL (ref 150–450)
PLATELET # BLD AUTO: 201 10(3)UL (ref 150–450)
PLATELET # BLD AUTO: 219 10(3)UL (ref 150–450)
PLATELET # BLD AUTO: 227 10(3)UL (ref 150–450)
PLATELET # BLD AUTO: 231 10(3)UL (ref 150–450)
PLATELET # BLD AUTO: 242 10(3)UL (ref 150–450)
PLATELET # BLD AUTO: 285 10(3)UL (ref 150–450)
PLATELET # BLD AUTO: 90 10(3)UL (ref 150–450)
PLATELET MORPHOLOGY: NORMAL
POTASSIUM SERPL-SCNC: 2.8 MMOL/L (ref 3.6–5.1)
POTASSIUM SERPL-SCNC: 2.8 MMOL/L (ref 3.6–5.1)
POTASSIUM SERPL-SCNC: 3.1 MMOL/L (ref 3.6–5.1)
POTASSIUM SERPL-SCNC: 3.1 MMOL/L (ref 3.6–5.1)
POTASSIUM SERPL-SCNC: 3.2 MMOL/L (ref 3.6–5.1)
POTASSIUM SERPL-SCNC: 3.6 MMOL/L (ref 3.6–5.1)
POTASSIUM SERPL-SCNC: 3.7 MMOL/L (ref 3.6–5.1)
POTASSIUM SERPL-SCNC: 4 MMOL/L (ref 3.6–5.1)
POTASSIUM SERPL-SCNC: 4.1 MMOL/L (ref 3.6–5.1)
POTASSIUM SERPL-SCNC: 4.2 MMOL/L (ref 3.6–5.1)
POTASSIUM SERPL-SCNC: 4.3 MMOL/L (ref 3.6–5.1)
POTASSIUM SERPL-SCNC: 4.3 MMOL/L (ref 3.6–5.1)
PROCALCITONIN SERPL-MCNC: 1.16 NG/ML (ref ?–0.11)
PROMYELOCYTE %: 2 %
PROMYELOCYTE %: 2 %
PROMYELOCYTE %: 4 %
PROMYELOCYTE ABSOLUTE MANUAL: 0.14 X10(3) UL (ref ?–0.01)
PROMYELOCYTE ABSOLUTE MANUAL: 0.42 X10(3) UL (ref ?–0.01)
PROMYELOCYTE ABSOLUTE MANUAL: 0.47 X10(3) UL (ref ?–0.01)
PROT UR STRIP.AUTO-MCNC: 100 MG/DL
PROT UR STRIP.AUTO-MCNC: 100 MG/DL
PROT UR STRIP.AUTO-MCNC: NEGATIVE MG/DL
PSA SERPL DL<=0.01 NG/ML-MCNC: 12.2 SECONDS (ref 11.7–13.9)
RBC # BLD AUTO: 2.45 X10(6)UL (ref 3.8–5.1)
RBC # BLD AUTO: 2.48 X10(6)UL (ref 3.8–5.1)
RBC # BLD AUTO: 2.56 X10(6)UL (ref 3.8–5.1)
RBC # BLD AUTO: 2.94 X10(6)UL (ref 3.8–5.1)
RBC # BLD AUTO: 3.09 X10(6)UL (ref 3.8–5.1)
RBC # BLD AUTO: 3.45 X10(6)UL (ref 3.8–5.1)
RBC # BLD AUTO: 3.5 X10(6)UL (ref 3.8–5.1)
RBC # BLD AUTO: 3.98 X10(6)UL (ref 3.8–5.1)
RBC # BLD AUTO: 4.2 X10(6)UL (ref 3.8–5.1)
RBC UR QL AUTO: NEGATIVE
RED CELL DISTRIBUTION WIDTH-SD: 48.6 FL (ref 35.1–46.3)
RED CELL DISTRIBUTION WIDTH-SD: 48.6 FL (ref 35.1–46.3)
RED CELL DISTRIBUTION WIDTH-SD: 55.4 FL (ref 35.1–46.3)
RED CELL DISTRIBUTION WIDTH-SD: 58.5 FL (ref 35.1–46.3)
RED CELL DISTRIBUTION WIDTH-SD: 58.7 FL (ref 35.1–46.3)
RED CELL DISTRIBUTION WIDTH-SD: 59.1 FL (ref 35.1–46.3)
RED CELL DISTRIBUTION WIDTH-SD: 59.4 FL (ref 35.1–46.3)
RED CELL DISTRIBUTION WIDTH-SD: 60.9 FL (ref 35.1–46.3)
RED CELL DISTRIBUTION WIDTH-SD: 61 FL (ref 35.1–46.3)
RHINOVIRUS/ENTERO PCR:: NEGATIVE
RSV RNA SPEC QL NAA+PROBE: NEGATIVE
SODIUM SERPL-SCNC: 128 MMOL/L (ref 136–144)
SODIUM SERPL-SCNC: 133 MMOL/L (ref 136–144)
SODIUM SERPL-SCNC: 134 MMOL/L (ref 136–144)
SODIUM SERPL-SCNC: 134 MMOL/L (ref 136–144)
SODIUM SERPL-SCNC: 135 MMOL/L (ref 136–144)
SODIUM SERPL-SCNC: 136 MMOL/L (ref 136–144)
SODIUM SERPL-SCNC: 138 MMOL/L (ref 136–144)
SODIUM SERPL-SCNC: 139 MMOL/L (ref 136–144)
SODIUM SERPL-SCNC: 140 MMOL/L (ref 136–144)
SP GR UR STRIP.AUTO: 1.02 (ref 1–1.03)
SP GR UR STRIP.AUTO: 1.02 (ref 1–1.03)
SP GR UR STRIP.AUTO: <1.005 (ref 1–1.03)
TOTAL CELLS COUNTED: 100
TOTAL CELLS COUNTED: 200
TOTAL HEMOGLOBIN: 7 G/DL (ref 11.7–16)
UROBILINOGEN UR STRIP.AUTO-MCNC: 4 MG/DL
UROBILINOGEN UR STRIP.AUTO-MCNC: 4 MG/DL
UROBILINOGEN UR STRIP.AUTO-MCNC: <2 MG/DL
WBC # BLD AUTO: 0.3 X10(3) UL (ref 4–13)
WBC # BLD AUTO: 1.9 X10(3) UL (ref 4–13)
WBC # BLD AUTO: 10.1 X10(3) UL (ref 4–13)
WBC # BLD AUTO: 10.4 X10(3) UL (ref 4–13)
WBC # BLD AUTO: 23.3 X10(3) UL (ref 4–13)
WBC # BLD AUTO: 23.6 X10(3) UL (ref 4–13)
WBC # BLD AUTO: 3.5 X10(3) UL (ref 4–13)
WBC # BLD AUTO: 3.9 X10(3) UL (ref 4–13)
WBC # BLD AUTO: 6.8 X10(3) UL (ref 4–13)

## 2018-01-01 PROCEDURE — 99215 OFFICE O/P EST HI 40 MIN: CPT | Performed by: INTERNAL MEDICINE

## 2018-01-01 PROCEDURE — 85025 COMPLETE CBC W/AUTO DIFF WBC: CPT

## 2018-01-01 PROCEDURE — 99213 OFFICE O/P EST LOW 20 MIN: CPT | Performed by: NURSE PRACTITIONER

## 2018-01-01 PROCEDURE — 86300 IMMUNOASSAY TUMOR CA 15-3: CPT

## 2018-01-01 PROCEDURE — 83735 ASSAY OF MAGNESIUM: CPT

## 2018-01-01 PROCEDURE — 82977 ASSAY OF GGT: CPT

## 2018-01-01 PROCEDURE — 80053 COMPREHEN METABOLIC PANEL: CPT

## 2018-01-01 PROCEDURE — 99232 SBSQ HOSP IP/OBS MODERATE 35: CPT | Performed by: HOSPITALIST

## 2018-01-01 PROCEDURE — 71045 X-RAY EXAM CHEST 1 VIEW: CPT | Performed by: INTERNAL MEDICINE

## 2018-01-01 PROCEDURE — 85007 BL SMEAR W/DIFF WBC COUNT: CPT

## 2018-01-01 PROCEDURE — 96417 CHEMO IV INFUS EACH ADDL SEQ: CPT

## 2018-01-01 PROCEDURE — 99233 SBSQ HOSP IP/OBS HIGH 50: CPT | Performed by: HOSPITALIST

## 2018-01-01 PROCEDURE — 99232 SBSQ HOSP IP/OBS MODERATE 35: CPT | Performed by: INTERNAL MEDICINE

## 2018-01-01 PROCEDURE — 88321 CONSLTJ&REPRT SLD PREP ELSWR: CPT

## 2018-01-01 PROCEDURE — 82248 BILIRUBIN DIRECT: CPT

## 2018-01-01 PROCEDURE — 96361 HYDRATE IV INFUSION ADD-ON: CPT

## 2018-01-01 PROCEDURE — 99215 OFFICE O/P EST HI 40 MIN: CPT | Performed by: NURSE PRACTITIONER

## 2018-01-01 PROCEDURE — 87086 URINE CULTURE/COLONY COUNT: CPT

## 2018-01-01 PROCEDURE — 96375 TX/PRO/DX INJ NEW DRUG ADDON: CPT

## 2018-01-01 PROCEDURE — 74177 CT ABD & PELVIS W/CONTRAST: CPT | Performed by: INTERNAL MEDICINE

## 2018-01-01 PROCEDURE — 99231 SBSQ HOSP IP/OBS SF/LOW 25: CPT | Performed by: INTERNAL MEDICINE

## 2018-01-01 PROCEDURE — 85610 PROTHROMBIN TIME: CPT

## 2018-01-01 PROCEDURE — 99223 1ST HOSP IP/OBS HIGH 75: CPT | Performed by: HOSPITALIST

## 2018-01-01 PROCEDURE — 99239 HOSP IP/OBS DSCHRG MGMT >30: CPT | Performed by: HOSPITALIST

## 2018-01-01 PROCEDURE — 99255 IP/OBS CONSLTJ NEW/EST HI 80: CPT | Performed by: INTERNAL MEDICINE

## 2018-01-01 PROCEDURE — 96372 THER/PROPH/DIAG INJ SC/IM: CPT

## 2018-01-01 PROCEDURE — 83615 LACTATE (LD) (LDH) ENZYME: CPT

## 2018-01-01 PROCEDURE — 84145 PROCALCITONIN (PCT): CPT

## 2018-01-01 PROCEDURE — 87077 CULTURE AEROBIC IDENTIFY: CPT

## 2018-01-01 PROCEDURE — 87186 SC STD MICRODIL/AGAR DIL: CPT

## 2018-01-01 PROCEDURE — 96413 CHEMO IV INFUSION 1 HR: CPT

## 2018-01-01 PROCEDURE — 96411 CHEMO IV PUSH ADDL DRUG: CPT

## 2018-01-01 PROCEDURE — 82378 CARCINOEMBRYONIC ANTIGEN: CPT

## 2018-01-01 PROCEDURE — 85730 THROMBOPLASTIN TIME PARTIAL: CPT

## 2018-01-01 PROCEDURE — 96365 THER/PROPH/DIAG IV INF INIT: CPT

## 2018-01-01 PROCEDURE — 81001 URINALYSIS AUTO W/SCOPE: CPT

## 2018-01-01 PROCEDURE — 96360 HYDRATION IV INFUSION INIT: CPT

## 2018-01-01 PROCEDURE — S0028 INJECTION, FAMOTIDINE, 20 MG: HCPCS | Performed by: INTERNAL MEDICINE

## 2018-01-01 PROCEDURE — 85027 COMPLETE CBC AUTOMATED: CPT

## 2018-01-01 PROCEDURE — 83605 ASSAY OF LACTIC ACID: CPT

## 2018-01-01 PROCEDURE — 99233 SBSQ HOSP IP/OBS HIGH 50: CPT | Performed by: STUDENT IN AN ORGANIZED HEALTH CARE EDUCATION/TRAINING PROGRAM

## 2018-01-01 PROCEDURE — 84100 ASSAY OF PHOSPHORUS: CPT

## 2018-01-01 PROCEDURE — 71260 CT THORAX DX C+: CPT | Performed by: INTERNAL MEDICINE

## 2018-01-01 PROCEDURE — 96376 TX/PRO/DX INJ SAME DRUG ADON: CPT

## 2018-01-01 PROCEDURE — 81003 URINALYSIS AUTO W/O SCOPE: CPT

## 2018-01-01 PROCEDURE — 87040 BLOOD CULTURE FOR BACTERIA: CPT

## 2018-01-01 PROCEDURE — 99215 OFFICE O/P EST HI 40 MIN: CPT | Performed by: CLINICAL NURSE SPECIALIST

## 2018-01-01 PROCEDURE — 71046 X-RAY EXAM CHEST 2 VIEWS: CPT | Performed by: INTERNAL MEDICINE

## 2018-01-01 RX ORDER — ACETAMINOPHEN 325 MG/1
TABLET ORAL EVERY 6 HOURS PRN
Status: CANCELLED | OUTPATIENT
Start: 2018-01-01

## 2018-01-01 RX ORDER — ONDANSETRON 4 MG/1
8 TABLET, FILM COATED ORAL EVERY 8 HOURS PRN
Status: DISCONTINUED | OUTPATIENT
Start: 2018-01-01 | End: 2018-01-01

## 2018-01-01 RX ORDER — ACETAMINOPHEN 325 MG/1
650 TABLET ORAL EVERY 6 HOURS PRN
Status: DISCONTINUED | OUTPATIENT
Start: 2018-01-01 | End: 2018-01-01

## 2018-01-01 RX ORDER — POTASSIUM CHLORIDE 20 MEQ/1
20 TABLET, EXTENDED RELEASE ORAL 2 TIMES DAILY
Qty: 60 TABLET | Refills: 0 | Status: SHIPPED | OUTPATIENT
Start: 2018-01-01 | End: 2018-01-01

## 2018-01-01 RX ORDER — DEXMEDETOMIDINE HYDROCHLORIDE 4 UG/ML
INJECTION, SOLUTION INTRAVENOUS CONTINUOUS
Status: CANCELLED | OUTPATIENT
Start: 2018-01-01

## 2018-01-01 RX ORDER — ALBUTEROL SULFATE 90 UG/1
2 AEROSOL, METERED RESPIRATORY (INHALATION) AS NEEDED
Status: CANCELLED | OUTPATIENT
Start: 2018-01-01

## 2018-01-01 RX ORDER — FAMOTIDINE 10 MG/ML
20 INJECTION, SOLUTION INTRAVENOUS ONCE
Status: CANCELLED
Start: 2018-01-01 | End: 2018-01-01

## 2018-01-01 RX ORDER — ONDANSETRON 2 MG/ML
4 INJECTION INTRAMUSCULAR; INTRAVENOUS EVERY 4 HOURS PRN
Status: DISCONTINUED | OUTPATIENT
Start: 2018-01-01 | End: 2018-01-01

## 2018-01-01 RX ORDER — SODIUM CHLORIDE 0.9 % (FLUSH) 0.9 %
10 SYRINGE (ML) INJECTION ONCE
Status: COMPLETED | OUTPATIENT
Start: 2018-01-01 | End: 2018-01-01

## 2018-01-01 RX ORDER — POLYETHYLENE GLYCOL 3350 17 G/17G
17 POWDER, FOR SOLUTION ORAL DAILY
COMMUNITY
End: 2018-01-01

## 2018-01-01 RX ORDER — MORPHINE SULFATE 2 MG/ML
1 INJECTION, SOLUTION INTRAMUSCULAR; INTRAVENOUS
Status: CANCELLED | OUTPATIENT
Start: 2018-01-01

## 2018-01-01 RX ORDER — DIPHENHYDRAMINE HYDROCHLORIDE 50 MG/ML
25 INJECTION INTRAMUSCULAR; INTRAVENOUS EVERY 4 HOURS PRN
Status: DISCONTINUED | OUTPATIENT
Start: 2018-01-01 | End: 2018-01-01

## 2018-01-01 RX ORDER — MEPERIDINE HYDROCHLORIDE 25 MG/ML
INJECTION INTRAMUSCULAR; INTRAVENOUS; SUBCUTANEOUS AS NEEDED
Status: CANCELLED | OUTPATIENT
Start: 2018-01-01

## 2018-01-01 RX ORDER — GLYCOPYRROLATE 0.2 MG/ML
0.4 INJECTION, SOLUTION INTRAMUSCULAR; INTRAVENOUS
Status: DISCONTINUED | OUTPATIENT
Start: 2018-01-01 | End: 2018-01-01

## 2018-01-01 RX ORDER — LORAZEPAM 2 MG/ML
1 INJECTION INTRAMUSCULAR EVERY 30 MIN PRN
Status: CANCELLED | OUTPATIENT
Start: 2018-01-01

## 2018-01-01 RX ORDER — SODIUM CHLORIDE 0.9 % (FLUSH) 0.9 %
10 SYRINGE (ML) INJECTION ONCE
Status: CANCELLED | OUTPATIENT
Start: 2018-01-01

## 2018-01-01 RX ORDER — PROCHLORPERAZINE MALEATE 10 MG
10 TABLET ORAL EVERY 6 HOURS PRN
Status: CANCELLED | OUTPATIENT
Start: 2018-01-01

## 2018-01-01 RX ORDER — CIPROFLOXACIN 500 MG/1
500 TABLET, FILM COATED ORAL 2 TIMES DAILY
Qty: 6 TABLET | Refills: 0 | Status: SHIPPED | OUTPATIENT
Start: 2018-01-01 | End: 2018-01-01 | Stop reason: ALTCHOICE

## 2018-01-01 RX ORDER — ASCORBIC ACID 500 MG
500 TABLET ORAL DAILY
Status: DISCONTINUED | OUTPATIENT
Start: 2018-01-01 | End: 2018-01-01

## 2018-01-01 RX ORDER — METOCLOPRAMIDE HYDROCHLORIDE 5 MG/ML
10 INJECTION INTRAMUSCULAR; INTRAVENOUS EVERY 6 HOURS PRN
Status: CANCELLED | OUTPATIENT
Start: 2018-01-01

## 2018-01-01 RX ORDER — FAMOTIDINE 10 MG/ML
20 INJECTION, SOLUTION INTRAVENOUS ONCE
Status: COMPLETED | OUTPATIENT
Start: 2018-01-01 | End: 2018-01-01

## 2018-01-01 RX ORDER — ONDANSETRON 2 MG/ML
8 INJECTION INTRAMUSCULAR; INTRAVENOUS EVERY 6 HOURS PRN
Status: CANCELLED | OUTPATIENT
Start: 2018-01-01

## 2018-01-01 RX ORDER — MORPHINE SULFATE 2 MG/ML
2 INJECTION, SOLUTION INTRAMUSCULAR; INTRAVENOUS EVERY 2 HOUR PRN
Status: DISCONTINUED | OUTPATIENT
Start: 2018-01-01 | End: 2018-01-01

## 2018-01-01 RX ORDER — DIPHENHYDRAMINE HYDROCHLORIDE 50 MG/ML
25 INJECTION INTRAMUSCULAR; INTRAVENOUS ONCE
Status: COMPLETED | OUTPATIENT
Start: 2018-01-01 | End: 2018-01-01

## 2018-01-01 RX ORDER — MORPHINE SULFATE 20 MG/ML
10 SOLUTION ORAL
Status: CANCELLED
Start: 2018-01-01

## 2018-01-01 RX ORDER — ACETAMINOPHEN 650 MG/1
650 SUPPOSITORY RECTAL EVERY 4 HOURS PRN
Status: DISCONTINUED | OUTPATIENT
Start: 2018-01-01 | End: 2018-01-01

## 2018-01-01 RX ORDER — DIAZEPAM 10 MG/1
TABLET ORAL
Refills: 0 | COMMUNITY
Start: 2017-01-01 | End: 2018-01-01

## 2018-01-01 RX ORDER — MAGNESIUM OXIDE 400 MG (241.3 MG MAGNESIUM) TABLET
400 TABLET DAILY
Status: DISCONTINUED | OUTPATIENT
Start: 2018-01-01 | End: 2018-01-01

## 2018-01-01 RX ORDER — MORPHINE SULFATE 15 MG/1
15 TABLET, FILM COATED, EXTENDED RELEASE ORAL EVERY 12 HOURS SCHEDULED
Qty: 60 TABLET | Refills: 0 | Status: SHIPPED | OUTPATIENT
Start: 2018-01-01 | End: 2018-01-01

## 2018-01-01 RX ORDER — LORAZEPAM 2 MG/ML
INJECTION INTRAMUSCULAR EVERY 4 HOURS PRN
Status: CANCELLED | OUTPATIENT
Start: 2018-01-01

## 2018-01-01 RX ORDER — MORPHINE SULFATE 15 MG/1
15 TABLET ORAL
Status: DISCONTINUED | OUTPATIENT
Start: 2018-01-01 | End: 2018-01-01

## 2018-01-01 RX ORDER — LORAZEPAM 2 MG/ML
INJECTION INTRAMUSCULAR
Status: COMPLETED
Start: 2018-01-01 | End: 2018-01-01

## 2018-01-01 RX ORDER — LORAZEPAM 2 MG/ML
1 INJECTION INTRAMUSCULAR EVERY 30 MIN PRN
Status: DISCONTINUED | OUTPATIENT
Start: 2018-01-01 | End: 2018-01-01

## 2018-01-01 RX ORDER — ALBUMIN, HUMAN INJ 5% 5 %
250 SOLUTION INTRAVENOUS ONCE
Status: COMPLETED | OUTPATIENT
Start: 2018-01-01 | End: 2018-01-01

## 2018-01-01 RX ORDER — MORPHINE SULFATE 15 MG/1
15 TABLET ORAL EVERY 4 HOURS PRN
Qty: 90 TABLET | Refills: 0 | Status: SHIPPED | OUTPATIENT
Start: 2018-01-01 | End: 2018-01-01

## 2018-01-01 RX ORDER — ENOXAPARIN SODIUM 100 MG/ML
40 INJECTION SUBCUTANEOUS DAILY
Status: DISCONTINUED | OUTPATIENT
Start: 2018-01-01 | End: 2018-01-01

## 2018-01-01 RX ORDER — MORPHINE SULFATE 2 MG/ML
1 INJECTION, SOLUTION INTRAMUSCULAR; INTRAVENOUS
Status: DISCONTINUED | OUTPATIENT
Start: 2018-01-01 | End: 2018-01-01

## 2018-01-01 RX ORDER — SODIUM CHLORIDE 0.9 % (FLUSH) 0.9 %
10 SYRINGE (ML) INJECTION ONCE
Status: DISCONTINUED | OUTPATIENT
Start: 2018-01-01 | End: 2018-01-01

## 2018-01-01 RX ORDER — BENZONATATE 200 MG/1
200 CAPSULE ORAL EVERY 4 HOURS PRN
Status: DISCONTINUED | OUTPATIENT
Start: 2018-01-01 | End: 2018-01-01

## 2018-01-01 RX ORDER — SENNA AND DOCUSATE SODIUM 50; 8.6 MG/1; MG/1
2 TABLET, FILM COATED ORAL DAILY
Status: DISCONTINUED | OUTPATIENT
Start: 2018-01-01 | End: 2018-01-01

## 2018-01-01 RX ORDER — ASCORBIC ACID 500 MG
500 TABLET ORAL DAILY
COMMUNITY
End: 2018-01-01

## 2018-01-01 RX ORDER — DIPHENHYDRAMINE HYDROCHLORIDE 50 MG/ML
INJECTION INTRAMUSCULAR; INTRAVENOUS EVERY 4 HOURS PRN
Status: CANCELLED | OUTPATIENT
Start: 2018-01-01

## 2018-01-01 RX ORDER — DEXMEDETOMIDINE HYDROCHLORIDE 4 UG/ML
INJECTION, SOLUTION INTRAVENOUS CONTINUOUS
Status: DISCONTINUED | OUTPATIENT
Start: 2018-01-01 | End: 2018-01-01

## 2018-01-01 RX ORDER — LEVOFLOXACIN 500 MG/1
500 TABLET, FILM COATED ORAL DAILY
Qty: 10 TABLET | Refills: 0 | Status: SHIPPED | OUTPATIENT
Start: 2018-01-01 | End: 2018-01-01

## 2018-01-01 RX ORDER — MORPHINE SULFATE 15 MG/1
7.5 TABLET ORAL
Status: DISCONTINUED | OUTPATIENT
Start: 2018-01-01 | End: 2018-01-01

## 2018-01-01 RX ORDER — POTASSIUM CHLORIDE 29.8 MG/ML
40 INJECTION INTRAVENOUS ONCE
Status: COMPLETED | OUTPATIENT
Start: 2018-01-01 | End: 2018-01-01

## 2018-01-01 RX ORDER — HYDROMORPHONE HYDROCHLORIDE 1 MG/ML
0.5 INJECTION, SOLUTION INTRAMUSCULAR; INTRAVENOUS; SUBCUTANEOUS EVERY 2 HOUR PRN
Status: DISCONTINUED | OUTPATIENT
Start: 2018-01-01 | End: 2018-01-01

## 2018-01-01 RX ORDER — LORAZEPAM 2 MG/ML
2 INJECTION INTRAMUSCULAR EVERY 30 MIN PRN
Status: CANCELLED | OUTPATIENT
Start: 2018-01-01

## 2018-01-01 RX ORDER — RANITIDINE 25 MG/ML
50 INJECTION, SOLUTION INTRAMUSCULAR; INTRAVENOUS AS NEEDED
Status: CANCELLED | OUTPATIENT
Start: 2018-01-01

## 2018-01-01 RX ORDER — MAGNESIUM SULFATE HEPTAHYDRATE 40 MG/ML
2 INJECTION, SOLUTION INTRAVENOUS ONCE
Status: COMPLETED | OUTPATIENT
Start: 2018-01-01 | End: 2018-01-01

## 2018-01-01 RX ORDER — HALOPERIDOL 5 MG/ML
2 INJECTION INTRAMUSCULAR
Status: DISCONTINUED | OUTPATIENT
Start: 2018-01-01 | End: 2018-01-01

## 2018-01-01 RX ORDER — POTASSIUM CHLORIDE 14.9 MG/ML
20 INJECTION INTRAVENOUS ONCE
Status: COMPLETED | OUTPATIENT
Start: 2018-01-01 | End: 2018-01-01

## 2018-01-01 RX ORDER — ATROPINE SULFATE 10 MG/ML
2 SOLUTION/ DROPS OPHTHALMIC EVERY 2 HOUR PRN
Status: DISCONTINUED | OUTPATIENT
Start: 2018-01-01 | End: 2018-01-01

## 2018-01-01 RX ORDER — ENOXAPARIN SODIUM 100 MG/ML
40 INJECTION SUBCUTANEOUS NIGHTLY
Status: DISCONTINUED | OUTPATIENT
Start: 2018-01-01 | End: 2018-01-01

## 2018-01-01 RX ORDER — BISACODYL 10 MG
10 SUPPOSITORY, RECTAL RECTAL
Status: DISCONTINUED | OUTPATIENT
Start: 2018-01-01 | End: 2018-01-01

## 2018-01-01 RX ORDER — BENZONATATE 100 MG/1
100 CAPSULE ORAL 3 TIMES DAILY PRN
Qty: 30 CAPSULE | Refills: 3 | Status: SHIPPED | OUTPATIENT
Start: 2018-01-01 | End: 2018-01-01

## 2018-01-01 RX ORDER — SODIUM CHLORIDE, SODIUM LACTATE, POTASSIUM CHLORIDE, CALCIUM CHLORIDE 600; 310; 30; 20 MG/100ML; MG/100ML; MG/100ML; MG/100ML
INJECTION, SOLUTION INTRAVENOUS CONTINUOUS
Status: DISCONTINUED | OUTPATIENT
Start: 2018-01-01 | End: 2018-01-01

## 2018-01-01 RX ORDER — MORPHINE SULFATE 2 MG/ML
4 INJECTION, SOLUTION INTRAMUSCULAR; INTRAVENOUS EVERY 2 HOUR PRN
Status: DISCONTINUED | OUTPATIENT
Start: 2018-01-01 | End: 2018-01-01

## 2018-01-01 RX ORDER — MORPHINE SULFATE 15 MG/1
TABLET ORAL
Status: DISCONTINUED | OUTPATIENT
Start: 2018-01-01 | End: 2018-01-01 | Stop reason: SDUPTHER

## 2018-01-01 RX ORDER — LORAZEPAM 0.5 MG/1
TABLET ORAL EVERY 4 HOURS PRN
Status: CANCELLED | OUTPATIENT
Start: 2018-01-01

## 2018-01-01 RX ORDER — IBUPROFEN 400 MG/1
200 TABLET ORAL EVERY 4 HOURS PRN
Status: DISCONTINUED | OUTPATIENT
Start: 2018-01-01 | End: 2018-01-01

## 2018-01-01 RX ORDER — HALOPERIDOL 5 MG/ML
1 INJECTION INTRAMUSCULAR
Status: DISCONTINUED | OUTPATIENT
Start: 2018-01-01 | End: 2018-01-01

## 2018-01-01 RX ORDER — PROCHLORPERAZINE MALEATE 10 MG
10 TABLET ORAL EVERY 6 HOURS PRN
Status: DISCONTINUED | OUTPATIENT
Start: 2018-01-01 | End: 2018-01-01

## 2018-01-01 RX ORDER — DIPHENHYDRAMINE HYDROCHLORIDE 50 MG/ML
INJECTION INTRAMUSCULAR; INTRAVENOUS EVERY 4 HOURS PRN
Status: DISCONTINUED | OUTPATIENT
Start: 2018-01-01 | End: 2018-01-01

## 2018-01-01 RX ORDER — POTASSIUM CHLORIDE 20 MEQ/1
40 TABLET, EXTENDED RELEASE ORAL ONCE
Status: COMPLETED | OUTPATIENT
Start: 2018-01-01 | End: 2018-01-01

## 2018-01-01 RX ORDER — SODIUM CHLORIDE 9 MG/ML
INJECTION, SOLUTION INTRAVENOUS CONTINUOUS
Status: DISCONTINUED | OUTPATIENT
Start: 2018-01-01 | End: 2018-01-01

## 2018-01-01 RX ORDER — LORAZEPAM 2 MG/ML
2 INJECTION INTRAMUSCULAR EVERY 30 MIN PRN
Status: DISCONTINUED | OUTPATIENT
Start: 2018-01-01 | End: 2018-01-01

## 2018-01-01 RX ORDER — DIPHENHYDRAMINE HYDROCHLORIDE 50 MG/ML
25 INJECTION INTRAMUSCULAR; INTRAVENOUS ONCE
Status: CANCELLED
Start: 2018-01-01 | End: 2018-01-01

## 2018-01-01 RX ORDER — MORPHINE SULFATE 15 MG/1
15 TABLET, FILM COATED, EXTENDED RELEASE ORAL EVERY 12 HOURS SCHEDULED
Status: DISCONTINUED | OUTPATIENT
Start: 2018-01-01 | End: 2018-01-01

## 2018-01-01 RX ORDER — IBUPROFEN 400 MG/1
400 TABLET ORAL EVERY 4 HOURS PRN
Status: DISCONTINUED | OUTPATIENT
Start: 2018-01-01 | End: 2018-01-01

## 2018-01-01 RX ORDER — MAGNESIUM OXIDE 400 MG (241.3 MG MAGNESIUM) TABLET
400 TABLET ONCE
Status: COMPLETED | OUTPATIENT
Start: 2018-01-01 | End: 2018-01-01

## 2018-01-01 RX ORDER — ONDANSETRON HYDROCHLORIDE 8 MG/1
8 TABLET, FILM COATED ORAL EVERY 8 HOURS PRN
Qty: 30 TABLET | Refills: 3 | Status: SHIPPED | OUTPATIENT
Start: 2018-01-01 | End: 2018-01-01

## 2018-01-01 RX ORDER — DIPHENHYDRAMINE HCL 25 MG
25 CAPSULE ORAL EVERY 6 HOURS PRN
Status: DISCONTINUED | OUTPATIENT
Start: 2018-01-01 | End: 2018-01-01

## 2018-01-01 RX ORDER — HYDROMORPHONE HYDROCHLORIDE 1 MG/ML
0.5 INJECTION, SOLUTION INTRAMUSCULAR; INTRAVENOUS; SUBCUTANEOUS EVERY 2 HOUR PRN
Status: CANCELLED
Start: 2018-01-01

## 2018-01-01 RX ORDER — ONDANSETRON 2 MG/ML
4 INJECTION INTRAMUSCULAR; INTRAVENOUS EVERY 4 HOURS PRN
Status: CANCELLED | OUTPATIENT
Start: 2018-01-01

## 2018-01-01 RX ORDER — LEVOFLOXACIN 250 MG/1
250 TABLET ORAL DAILY
Qty: 10 TABLET | Refills: 0 | Status: CANCELLED
Start: 2018-01-01

## 2018-01-01 RX ORDER — ALBUTEROL SULFATE 2.5 MG/3ML
SOLUTION RESPIRATORY (INHALATION)
Status: COMPLETED
Start: 2018-01-01 | End: 2018-01-01

## 2018-01-01 RX ORDER — ACETAMINOPHEN 500 MG
1000 TABLET ORAL EVERY 6 HOURS PRN
Status: DISCONTINUED | OUTPATIENT
Start: 2018-01-01 | End: 2018-01-01

## 2018-01-01 RX ORDER — MORPHINE SULFATE 20 MG/ML
10 SOLUTION ORAL ONCE
Status: COMPLETED | OUTPATIENT
Start: 2018-01-01 | End: 2018-01-01

## 2018-01-01 RX ORDER — SCOLOPAMINE TRANSDERMAL SYSTEM 1 MG/1
1 PATCH, EXTENDED RELEASE TRANSDERMAL
Status: DISCONTINUED | OUTPATIENT
Start: 2018-01-01 | End: 2018-01-01

## 2018-01-01 RX ORDER — FUROSEMIDE 10 MG/ML
40 INJECTION INTRAMUSCULAR; INTRAVENOUS EVERY 8 HOURS PRN
Status: DISCONTINUED | OUTPATIENT
Start: 2018-01-01 | End: 2018-01-01

## 2018-01-01 RX ORDER — POLYETHYLENE GLYCOL 3350 17 G/17G
17 POWDER, FOR SOLUTION ORAL DAILY
Status: DISCONTINUED | OUTPATIENT
Start: 2018-01-01 | End: 2018-01-01

## 2018-01-01 RX ORDER — MORPHINE SULFATE 15 MG/1
15 TABLET ORAL EVERY 4 HOURS PRN
Status: DISCONTINUED | OUTPATIENT
Start: 2018-01-01 | End: 2018-01-01

## 2018-01-01 RX ORDER — IBUPROFEN 600 MG/1
600 TABLET ORAL EVERY 4 HOURS PRN
Status: DISCONTINUED | OUTPATIENT
Start: 2018-01-01 | End: 2018-01-01

## 2018-01-01 RX ADMIN — DIPHENHYDRAMINE HYDROCHLORIDE 25 MG: 50 INJECTION INTRAMUSCULAR; INTRAVENOUS at 11:55:00

## 2018-01-01 RX ADMIN — DIPHENHYDRAMINE HYDROCHLORIDE 25 MG: 50 INJECTION INTRAMUSCULAR; INTRAVENOUS at 16:57:00

## 2018-01-01 RX ADMIN — SODIUM CHLORIDE 0.9 % (FLUSH) 10 ML: 0.9 % SYRINGE (ML) INJECTION at 11:32:00

## 2018-01-01 RX ADMIN — FAMOTIDINE 20 MG: 10 INJECTION, SOLUTION INTRAVENOUS at 12:25:00

## 2018-01-01 RX ADMIN — FAMOTIDINE 20 MG: 10 INJECTION, SOLUTION INTRAVENOUS at 16:07:00

## 2018-01-01 RX ADMIN — FAMOTIDINE 20 MG: 10 INJECTION, SOLUTION INTRAVENOUS at 12:10:00

## 2018-01-01 RX ADMIN — SODIUM CHLORIDE 0.9 % (FLUSH) 10 ML: 0.9 % SYRINGE (ML) INJECTION at 15:15:00

## 2018-01-01 RX ADMIN — DIPHENHYDRAMINE HYDROCHLORIDE 25 MG: 50 INJECTION INTRAMUSCULAR; INTRAVENOUS at 16:05:00

## 2018-01-01 RX ADMIN — FAMOTIDINE 20 MG: 10 INJECTION, SOLUTION INTRAVENOUS at 17:21:00

## 2018-01-01 RX ADMIN — POTASSIUM CHLORIDE 40 MEQ: 20 TABLET, EXTENDED RELEASE ORAL at 15:00:00

## 2018-01-01 RX ADMIN — SODIUM CHLORIDE 0.9 % (FLUSH) 10 ML: 0.9 % SYRINGE (ML) INJECTION at 18:30:00

## 2018-01-01 RX ADMIN — SODIUM CHLORIDE 0.9 % (FLUSH) 10 ML: 0.9 % SYRINGE (ML) INJECTION at 15:34:00

## 2018-01-01 RX ADMIN — SODIUM CHLORIDE 0.9 % (FLUSH) 10 ML: 0.9 % SYRINGE (ML) INJECTION at 14:50:00

## 2018-01-01 RX ADMIN — DIPHENHYDRAMINE HYDROCHLORIDE 25 MG: 50 INJECTION INTRAMUSCULAR; INTRAVENOUS at 12:22:00

## 2018-01-01 RX ADMIN — MORPHINE SULFATE 10 MG: 20 SOLUTION ORAL at 15:45:00

## 2018-01-01 RX ADMIN — HYDROMORPHONE HYDROCHLORIDE 0.5 MG: 1 INJECTION, SOLUTION INTRAMUSCULAR; INTRAVENOUS; SUBCUTANEOUS at 13:35:00

## 2018-01-02 NOTE — TELEPHONE ENCOUNTER
Patient calling b/c spoke with Susanne pharm. And they states we need to submit PA for dromabinol. Received form from [employer] today. [place] will submit. Called and LM for patient letting her know [facility] submitting PA for medication.

## 2018-01-04 PROBLEM — E87.6 HYPOKALEMIA: Status: ACTIVE | Noted: 2018-01-01

## 2018-01-04 NOTE — PROGRESS NOTES
Patient here for MD f/u. Increased anxiety. Increased fatigue d/t Dexamethasone taper. Is supposed to be taking every other day but has taken more when she is feeling \"down\".

## 2018-01-04 NOTE — PATIENT INSTRUCTIONS
For Dr. Vandana Olvera nurse line, call [phone] with any questions or concerns,  Monday through Friday 8:00 to 4:30. After hours or weekends for urgent needs: [phone].   Central [phone]  Medical Records:   [phone]  Cancer Cent

## 2018-01-04 NOTE — PROGRESS NOTES
Hematology/Oncology Clinic Follow Up Visit    Patient Name: Efraín Beal  Medical Record Number: [id number]    YOB: 1965    PCP: Dr. Luis Enrique Barriga    Reason for Consultation:  Efraín Beal was seen today for the diagnosis of metastatic breast canc daily    =============================================  Interval events:  Presents for follow up. She is taking 1mg dexamethasone every other day, feeling more tired. Ongoing anxiety.   Pain in left pelvis increased, but mild and controlled with taking mo Types: Cigarettes    Smokeless tobacco: Never Used    Comment: smoked socially only, less than 1 pack per month for years, none since 2015.     Alcohol use Yes    Comment: rare use, no hx of excessive use    Drug use: No    Sexual activity: Not on file tenderness    Laboratory:    Lab Results  Component Value Date   WBC 3.9 (L) 01/04/2018   WBC 8.0 12/28/2017   WBC 25.3 (H) 12/18/2017   HGB 13.0 01/04/2018   HGB 13.7 12/28/2017   HGB 15.5 12/18/2017   HCT 37.3 01/04/2018   MCV 88.8 01/04/2018   MCH 31.0 today given urgency with impending liver crisis  -proceed with weekly C1D1 carbo/taxol as below:   -carboplatin AUC2 IV D1,8,15   -paclitaxel 80mg/m2 IV D1,8,15   Cycle length q21 days  -hopefully only need to give about 2 cycles before switching back to e

## 2018-01-04 NOTE — PATIENT INSTRUCTIONS
[facility] Chemo Education Overview    Learner:  Patient and Family Member--MOTHER    Learner's Barriers / Limitations of Education:  None    Names of Drugs:      Chemotherapy: TAXOL AND CARBOPLATIN GIVEN WEEKLY           Anti-Nausea:  PER DR. TYLER

## 2018-01-05 NOTE — PROGRESS NOTES
THOMAS met yesterday with the patient. She had a lot of questions about resources, the Medical Cannabis program. THE [facility] financial assistance etc. THOMAS mailed the entire terminal medical cannabis application in today.  THOMAS was able to get patient signed up on My Ch

## 2018-01-05 NOTE — PROGRESS NOTES
Date of Treatment: 1/4/18                                Type of Chemo: Carbo/Taxol    Comments: pt met with SW today- see note. Recommendations: see note.

## 2018-01-08 NOTE — TELEPHONE ENCOUNTER
Patient called to report a rash and redness on her knuckles and neck. She thinks this is from her nausea medication since there is tree nuts in the compounding and she is allergic. She is using dexamethasone and benedryl with benefit.   She denies rash is

## 2018-01-08 NOTE — PROGRESS NOTES
IV Chemotherapy Education    Patient:Marva Smith   Date:  1/5/2018  Diagnosis:  Metastatic Breast Cancer  Caregivers present: none    Drug names:  Carboplatin / Paclitaxel     Treatment Effects on Bone Marrow:  Chemotherapy action on cancer / normal cells} counseling patient regarding the above documented side effects and management, when to call provider and contact information.      Deepthi NICHOLSON  [facility]

## 2018-01-11 PROBLEM — T45.1X5A CHEMOTHERAPY INDUCED NEUTROPENIA (HCC): Status: ACTIVE | Noted: 2018-01-01

## 2018-01-11 PROBLEM — D70.1 CHEMOTHERAPY INDUCED NEUTROPENIA (HCC): Status: ACTIVE | Noted: 2018-01-01

## 2018-01-11 NOTE — PROGRESS NOTES
Hematology/Oncology Clinic Follow Up Visit    Patient Name: Alis Panda  Medical Record Number: [id number]    YOB: 1965    PCP: Dr. Aliza Bennett    Reason for Consultation:  Alis Panda was seen today for the diagnosis of metastatic breast canc due to increased LFTs and tumor markers and concern for impending visceral liver crisis switched to carbo/taxol (carboplatin AUC2 IV, paclitaxel 80mg/m2 IV both D1,8,15; q21 days)    =============================================  Interval events:  Presents Take 1 tablet (20 mEq total) by mouth 2 (two) times daily. Disp: 60 tablet Rfl: 0   Ondansetron HCl (ZOFRAN) 8 MG tablet Take 1 tablet (8 mg total) by mouth every 8 (eight) hours as needed for Nausea.  Disp: 30 tablet Rfl: 3   Prochlorperazine Maleate (COMP Father 80       Review of Systems:  A 10-point ROS was done with pertinent positives and negative per the HPI    Vital Signs:  Height: --  Weight: 52.1 kg (114 lb 12.8 oz) (01/11 1018)  BSA (Calculated - sq m): --  Pulse: 99 (01/11 1018)  BP: 125/82 (01/11 01/11/2018    01/11/2018   CO2 21.0 (L) 01/11/2018       Lab Results  Component Value Date   PTT 26.4 01/04/2018   INR 0.94 01/04/2018     Component BREAST CARCINOMA Ag ([id number])   Latest Ref Rng & Units <38.0 u/mL   1/4/2018 967.9 (H)   12/18/2017 70 oral hygiene, mouth rinses, take morphine prn more liberally to allow adequate PO intake  -if ongoing odynophagia next week without improvement need to consider endoscopy    *severe neutropenia due to chemotherapy  -hold chemo today  -will give zarxio SC d

## 2018-01-11 NOTE — PROGRESS NOTES
Patient here for MD f/u and treatment. C/O increased fatigue. Decreased appetite. States feeling cold all the time since starting treatment.

## 2018-01-11 NOTE — PATIENT INSTRUCTIONS
For Dr. Dacosta Loser nurse line, call [phone] with any questions or concerns,  Monday through Friday 8:00 to 4:30. After hours or weekends for urgent needs: [phone].   Central [phone]  Medical Records:   [phone]  Cancer Cent

## 2018-01-12 PROBLEM — T45.1X5A CHEMOTHERAPY INDUCED DIARRHEA: Status: ACTIVE | Noted: 2018-01-01

## 2018-01-12 PROBLEM — K52.1 CHEMOTHERAPY INDUCED DIARRHEA: Status: ACTIVE | Noted: 2018-01-01

## 2018-01-12 PROBLEM — K12.31 MUCOSITIS DUE TO CHEMOTHERAPY: Status: ACTIVE | Noted: 2018-01-01

## 2018-01-12 PROBLEM — L27.0 ALLERGIC DRUG RASH: Status: ACTIVE | Noted: 2018-01-01

## 2018-01-12 NOTE — PATIENT INSTRUCTIONS
May take Aleve 1 tablet every 12 hours as needed for the patient. Not to exceed more than 2 pills of Aleve a day.

## 2018-01-12 NOTE — PROGRESS NOTES
Palliative Care Follow up Note    Patient Name: Cristobal Ortiz   YOB: 1965   Medical Record Number: [id number]   CSN: [id number]   Date of visit: 1/12/2018       Chief Complaint/Reason for Visit:  Palliative follow up     History of Present Illne 2015.    Alcohol use Yes    Comment: rare use, no hx of excessive use    Drug use: No    Sexual activity: Not on file     Other Topics Concern   None on file     Social History Narrative    Single. Lives alone in [place].   Works as an  at scattered small oral mucositis ulcers  Respiratory: Clear to auscultation. Cardiac: Regular rate and rhythm. No edema  Abdomen: Soft, non tender with good bowel sounds.   Psych: Anxious, hyperverbal      Advanced Directives Discussed and Completed:     N

## 2018-01-12 NOTE — PROGRESS NOTES
THOMAS mailed pt medical Cannabis application to [facility]. As requested by patient.

## 2018-01-16 NOTE — TELEPHONE ENCOUNTER
Call removed at [phone]. Pt calling to report bilateral ankle swelling since last night- no relief with elevation. Pt had spoken to Salbador Kelly- see note.

## 2018-01-16 NOTE — TELEPHONE ENCOUNTER
Patient called stating no one has returned her call from earlier. Patient complaint of swelling in bilateral ankles. Patient states it feels tight. Patient denies any increase SOB from her baseline. Patient denies any redness.  Patient worried about blood c

## 2018-01-17 NOTE — TELEPHONE ENCOUNTER
I called to check up on patient today. She had called yesterday with bilat ankle swelling. She was going to make appt with [facility] today to be assessed. Spoke with patient and she states swelling improved. She does not feel she needs to be seen today.   She

## 2018-01-18 NOTE — PROGRESS NOTES
Hematology/Oncology Clinic Follow Up Visit    Patient Name: Manuel Webber  Medical Record Number: [id number]    YOB: 1965    PCP: Dr. Tahira Chaudhary    Reason for Consultation:  Manuel Webber was seen today for the diagnosis of metastatic breast canc due to increased LFTs and tumor markers and concern for impending visceral liver crisis switched to carbo/taxol (carboplatin AUC2 IV, paclitaxel 80mg/m2 IV both D1,8,15; q21 days)    =============================================  Interval events:  Presents Comment:Hives, joint pain  Tetracyclines & Rel*    Hives    Comment:Hives, joint pain  Tree Nuts                   Psychosocial History:    Social History  Social History   Marital status: Single  Spouse name: N/A    Years of education: N/A  Number of chil rhythm, no murmurs  Respiratory: Lungs clear to auscultation bilaterally  GI/Abd: Soft, non-tender with normoactive bowel sounds, no hepatosplenomegaly  Neurological: Grossly intact   Lymphatics: No palpable lymphadenopathy   Skin: +cracking of skin and dr above    Impression & Plan:     *metastatic breast cancer- ER+/IN+/SPX4lob  -metastasis to liver and bone. Very extensive liver disease.  Started with letrozole/OST+palbociclib however after 2 weeks her LFTs and tumor markers continue to rise with concern and benadryl tonight and prn over the next few days, all in addition to std zantac, steroid, and benadryl premeds to chemo today.   -to only use zofran prn for nausea to avoid confusion. Advised to avoid all new supplements, or OTC meds unless directed.

## 2018-01-18 NOTE — PATIENT INSTRUCTIONS
PER DR. GHOTRA, HOLD OFF ON XGEVA INJECTION AT THIS TIME.     Education Record    Learner:  Patient and -Mother    Disease / Diagnosis:METASTATIC BREAST CANCER    Barriers / Limitations:  None   Comments:    Method:  Brief focused and Reinforcement   Commen

## 2018-01-18 NOTE — PATIENT INSTRUCTIONS
For Dr. Kwesi Rockwell nurse line, call [phone] with any questions or concerns,  Monday through Friday 8:00 to 4:30. After hours or weekends for urgent needs: [phone].   Central [phone]  Medical Records:   [phone]  Cancer Cent

## 2018-01-18 NOTE — PROGRESS NOTES
Patient here for MD f/u. C/O some fatigue, SOB with exertion. Dry cough. Denies fevers, cold in AM.  Bilat ankle swelling, improved since two days ago. Denies nausea. Slight constipation.

## 2018-01-19 PROBLEM — T45.1X5A ANEMIA ASSOCIATED WITH CHEMOTHERAPY: Status: ACTIVE | Noted: 2018-01-01

## 2018-01-19 PROBLEM — D64.81 ANEMIA ASSOCIATED WITH CHEMOTHERAPY: Status: ACTIVE | Noted: 2018-01-01

## 2018-01-19 PROBLEM — D69.6 THROMBOCYTOPENIA (HCC): Status: ACTIVE | Noted: 2018-01-01

## 2018-01-19 NOTE — PROGRESS NOTES
Palliative Care Follow up Note    Patient Name: Leslye Barraza   YOB: 1965   Medical Record Number: [id number]   CSN: [id number]   Date of visit: 1/19/2018       Chief Complaint/Reason for Visit:  Palliative follow up     History of Present Illne Years of education: N/A  Number of children: N/A     Occupational History  None on file     Social History Main Topics   Smoking status: Former Smoker     Types: Cigarettes    Smokeless tobacco: Never Used    Comment: smoked socially only, less than 1 p mouth daily. Every other day , Disp: , Rfl:   •  magnesium oxide 400 MG Oral Tab, Take 400 mg by mouth daily. , Disp: , Rfl:     Review of Systems:   General:  Fatigue.   Anxious  Respiratory:  Denies SOB, denies cough  Cardiac:  Denies chest pain, heart pal

## 2018-01-24 PROBLEM — E80.6 HYPERBILIRUBINEMIA: Status: ACTIVE | Noted: 2018-01-01

## 2018-01-24 NOTE — PATIENT INSTRUCTIONS
For Dr. Humaira Catalan nurse line, call [phone] with any questions or concerns,  Monday through Friday 8:00 to 4:30. After hours or weekends for urgent needs: [phone].   Central [phone]  Medical Records:   [phone]  Cancer Cent

## 2018-01-24 NOTE — PROGRESS NOTES
Education Record    Learner:  Patient and Family Member    Disease / Diagnosis: Breast cancer    Barriers / Limitations:  None   Comments:    Method:  Brief focused   Comments:    General Topics:  Side effects and symptom management and Plan of care review

## 2018-01-24 NOTE — PROGRESS NOTES
BRIEF NUTRITION NOTE:     DX: metastatic breast cancer- ER+/WV+/QBB5mxd  -metastasis to liver and bone.   Very extensive liver disease    TX: carbo/taxol    A/P: RD attempted to meet w/ pt during her tx today; however, pt w/ several physical c/o and request

## 2018-01-24 NOTE — PROGRESS NOTES
Patient here for MD prado/pelon and treatment. Had called and LM late Friday night that she was doing OK. She says since then she has gotten worse. States fell at home on Sat. Evening. Hit elbow, doing OK.   Pain \"all over\", especially shooting down her back

## 2018-01-24 NOTE — PROGRESS NOTES
Hematology/Oncology Clinic Follow Up Visit    Patient Name: Bc Thomas  Medical Record Number: [id number]    YOB: 1965    PCP: Dr. Tarah Leavitt    Reason for Consultation:  cB Thomas was seen today for the diagnosis of metastatic breast canc due to increased LFTs and tumor markers and concern for impending visceral liver crisis switched to carbo/taxol (carboplatin AUC2 IV, paclitaxel 80mg/m2 IV both D1,8,15; q21 days)   -c/b allergic rash (?cause), fatigue, LE edema, severe neutropenia  -1/18/ 5000 units Oral Tab Take 1 tablet by mouth daily. Every other day  Disp:  Rfl:    magnesium oxide 400 MG Oral Tab Take 400 mg by mouth daily.  Disp:  Rfl:      Allergies:     Doxycycline             Hives    Comment:Hives, joint pain  Minocycline oz)    ECOG PS: 0    Physical Examination:  General: Patient is alert and oriented, not in acute distress, appears fatigued  Psych: mood and affect appropriate  Eyes: EOMI  ENT: +mild patches of erythema on buccal mucosa, no ulceration.    CV: tachycardiac, 84 - 246 U/L   1/18/2018 783 (H)   1/4/2018 949 (H)   12/18/2017      Component      Latest Ref Rng & Units 12/18/2017   VITAMIN D, 25-HYDROXY      30.0 - 100.0 ng/mL 38.2     Imaging:    CT c/a/p 1/24/18: FINDINGS:     CHEST:    LUNGS:  No focal pulmonary or dissection. There is engorgement of the azygous and vishal azygous venous system of uncertain significance. VASCULATURE:  No visible pulmonary arterial thrombus or attenuation. RETROPERITONEUM:  No mass or adenopathy.     BOWEL/MESENTERY:  The stomac are noted, however continued followup imaging is recommended. 3. The prior noted multiple lucent osseous metastatic lesions now appear sclerotic suggesting interval treatment/healing.   There is concern however for a small, new 3 mm lucent lesion within th chemo, and then 2 additional days after chemotherapy   -omit carboplatin going forward  -repeat CBC next week    *anemia - due to chemotherapy  -remains mild, acceptable  -repeat CBC next week     *pain- due to malignancy (breast, liver, pelvic bone), chem

## 2018-01-25 NOTE — PROGRESS NOTES
Patient was given Pepcid 20mg IVP at 1721. Rechecked /73, HR 83 100% 02, pain is better. Per Polly okay to restart Taxol. Restarted at 1730.

## 2018-01-25 NOTE — PROGRESS NOTES
Patient's call light went off and upon entering the room the patient was very red in the face. Immediately turned off pump and called for help.  VS taken 100/58, , 94% (placed on 3L NC), pain in lower back and stomach, and felt like she was having a H

## 2018-01-25 NOTE — PATIENT INSTRUCTIONS
Per Dr Leena Carver, take Dexamethasone 4 mg twice a day (every 12 hours) for 3 days. Take Benadryl 50 mg tonight (one time only, unless rash develops).

## 2018-01-25 NOTE — PROGRESS NOTES
\"Please advise pt tomorrow to take dexamethasone 4mg BID x 3 days following chemotherapy tomorrow and to take benadryl 50mg x 1 tomorrow evening, then stop unless gets rash. \"    Instructions given to RN caring for patient today, Sherwin Duncan.

## 2018-01-26 NOTE — TELEPHONE ENCOUNTER
Pt calling with c/o bladder pressure and urgency. States Alba Castelan thinks she has a UTI\". Discussed with Cornell Evans, LYN- order received for UA and culture. Instructed pt to give a urine sample @ injection appt today. Portillo Jose RN 5 notified.

## 2018-01-26 NOTE — PROGRESS NOTES
Palliative Care Follow up Note    Patient Name: Brooklyn Faith   YOB: 1965   Medical Record Number: [id number]   CSN: [id number]   Date of visit: 1/26/2018       Chief Complaint/Reason for Visit:  dysuria     History of Present Illness:   48y old file     Social History Narrative    Single. Lives alone in [place]. Works as an  at [employer]- on leave since diagnosis.  Has family in the area- 2 brothers, 2 sisters, and her mother in [facility]       Medications:    Current Outpatient Presc oriented, not in acute distress. Respiratory: Clear to auscultation. Cardiac: Regular rate and rhythm. Abdomen: Soft, non tender with good bowel sounds. MSK:  No CVA pain  Psych: Mood/Affect appropriate    Labs:  Reviewed UA.   UA negative      Advanc

## 2018-01-26 NOTE — TELEPHONE ENCOUNTER
Patient left message stating she has UTI symptoms. Left her a message to obtain UA/CS when she comes to clinic today. Will start antibiotic. It is ok for her to use AZO as requested.

## 2018-01-29 NOTE — PROGRESS NOTES
Patient presents with:  Pain: APN assessment & Palliative care follow up    Pt arrived at cancer center without apt today due to uncontrollable pain and continuing bladder spasms. Pt is very emotional today and her appearance/palor is poor.  She states she

## 2018-01-29 NOTE — PROGRESS NOTES
Education Record    Learner:  Patient and Family Member    Disease / Diagnosis:met breast ca/neutropenia,mouth sores    Barriers / Limitations:  None   Comments:    Method:  Discussion   Comments:    General Topics:  Diet, Infection, Medication, Pain, Prec

## 2018-01-29 NOTE — PROGRESS NOTES
Palliative Care Follow up Note    Patient Name: Xu Meyer   YOB: 1965   Medical Record Number: [id number]   CSN: [id number]   Date of visit: 1/29/2018       Chief Complaint/Reason for Visit:  Patient presents with:  Pain: APN assessment & Pa Social History:     Social History  Social History   Marital status: Single  Spouse name: N/A    Years of education: N/A  Number of children: N/A     Occupational History  None on file     Social History Main Topics   Smoking status: Former Smoker Take 1 tablet (10 mg total) by mouth every 6 (six) hours as needed for Nausea., Disp: 30 tablet, Rfl: 3  •  Vitamin C 500 MG Oral Tab, Take 500 mg by mouth daily. , Disp: , Rfl:   •  Potassium Chloride ER 20 MEQ Oral Tab CR, Take 1 tablet (20 mEq total) by hydration    Pain  1. MS Contin 15mg Q 12  2. Morphine IR 15mg Q 3 prn    Constipation  1. miralax daily    Fatigue  1.   Out of bed daily    Dysuria  1.  levoquin 500mg Daily X 10days    Planned Follow up:   1 week    45 total minutes spent with patient

## 2018-02-01 PROBLEM — R50.9 FEVER IN ADULT: Status: ACTIVE | Noted: 2018-01-01

## 2018-02-01 NOTE — PROGRESS NOTES
Patient here for follow up with Oscar Badillo and Dr. Harmeet Jani and scheduled for C2/D1 of Taxol for breast ca. Comes in with fever, mucositis, and rashes. Mouth pain 10/10. Tympanic temperature 102.6, 100.0 orally. HR in the 120's and orthostatic.  Seen by Oscar Badillo and

## 2018-02-01 NOTE — PROGRESS NOTES
Hematology/Oncology Clinic Follow Up Visit    Patient Name: Leslye Barraza  Medical Record Number: [id number]    YOB: 1965    PCP: Dr. Belkis Fortune    Reason for Consultation:  Leslye Barraza was seen today for the diagnosis of metastatic breast canc due to increased LFTs and tumor markers and concern for impending visceral liver crisis switched to carbo/taxol (carboplatin AUC2 IV, paclitaxel 80mg/m2 IV both D1,8,15; q21 days)   -c/b allergic rash (?cause), fatigue, LE edema, severe neutropenia  -1/18/ tablet Rfl: 0   morphINE Sulfate ER 15 MG Oral Tab CR Take 1 tablet (15 mg total) by mouth every 12 (twelve) hours. Disp: 60 tablet Rfl: 0   benzonatate 100 MG Oral Cap Take 1 capsule (100 mg total) by mouth 3 (three) times daily as needed for cough.  Disp: (02/01 1145)  Temp: 100 °F (37.8 °C) (02/01 1147)  Do Not Use - Resp Rate: --  SpO2: 98 % (02/01 1145)    Wt Readings from Last 6 Encounters:  01/29/18 : 50.4 kg (111 lb 3.2 oz)  01/26/18 : 52.6 kg (116 lb)  01/25/18 : 51.4 kg (113 lb 6.4 oz)  01/24/18 : 5 Results  Component Value Date   PTT 26.4 01/04/2018   INR 0.94 01/04/2018     Component BREAST CARCINOMA Ag ([id number]) CEA   Latest Ref Rng & Units <38.0 u/mL 0.5 - 5.0 ng/mL   1/18/2018 712.9 (H) 879.4 (H)   1/4/2018 967.9 (H) 827.1 (H)   12/18/2017 703.5 ( approximately 3 mm in diameter. No obstructing lesions of the biliary tree noted. PANCREAS:  The pancreas and pancreatic duct appear unremarkable. SPLEEN:  No enlargement or focal lesion. KIDNEYS:  No mass, obstruction, or calcification.     ADRENALS: comparison of metastatic lesions somewhat difficult due to the decreased density of the background hepatic parenchyma. As seen, several of the prior noted metastatic lesions appear to have decreased in size.   There are several lesions which are stable as *mucositis/odynophagia  -due to chemotherapy  -continue oral hygiene, mouth rinses, take morphine prn more liberally to allow adequate PO intake  -mucositis mixture  -may need liquid morphine, IVF until improves which should be soon  -if ongoing fevers

## 2018-02-01 NOTE — PATIENT INSTRUCTIONS
For Dr. Dawn Benson nurse line, call [phone] with any questions or concerns,  Monday through Friday 8:00 to 4:30. After hours or weekends for urgent needs: [phone].   Central [phone]  Medical Records:   [phone]  Cancer Cent

## 2018-02-01 NOTE — PROGRESS NOTES
Palliative Care Follow up Note    Patient Name: Wayne Somers   YOB: 1965   Medical Record Number: [id number]   CSN: [id number]   Date of visit: 2/1/2018       Chief Complaint/Reason for Visit:  Palliative follow up     History of Present Illnes History  Social History   Marital status: Single  Spouse name: N/A    Years of education: N/A  Number of children: N/A     Occupational History  None on file     Social History Main Topics   Smoking status: Former Smoker     Types: Cigarettes    Smokeless tablet, Take 1 tablet (8 mg total) by mouth every 8 (eight) hours as needed for Nausea., Disp: 30 tablet, Rfl: 3  •  Prochlorperazine Maleate (COMPAZINE) 10 mg tablet, Take 1 tablet (10 mg total) by mouth every 6 (six) hours as needed for Nausea., Disp: 30 pain control as pain is well controlled. She may need liquid morphine for BTP if pills remain difficult to swallow      Impression/Plan:   Pain  1. MS Contin 15mg Q 12  2. Morphine IR 15mg Q 4 prn    Constipation  1.  miralax prn    Stomatitis  1.   Maal

## 2018-02-01 NOTE — PROGRESS NOTES
Patient arrived from cancer center. MD paged. Admission navigator complete. Waiting for orders. Patient in Xray now. Call light in reach. All needs addressed. 1810- Patient back from Xray. MD paged again for orders.     Pt complains of mouth painful ulc

## 2018-02-02 NOTE — CONSULTS
Received palliative care consultation order. Patient is known to outpatient palliative care APN. Plan is for patient to continue seeing palliative at discharge. Reviewed with oncologist - no need for palliative to see as inpatient.   Discharge orders ailyn

## 2018-02-02 NOTE — CONSULTS
Hematology/Oncology Initial Consultation Note    Patient Name: Pilar Isaacs  Medical Record Number: [id number]    YOB: 1965   Date of Consultation: 2/2/2018   Physician requesting consultation: Dr. Clotilde Stallworth    Reason for Consultation:  Florentino Silver palbociclib 125mg PO daily d1-21 q28 days + letrozole 2.5mg daily  -1/4/18- due to increased LFTs and tumor markers and concern for impending visceral liver crisis switched to carbo/taxol (carboplatin AUC2 IV, paclitaxel 80mg/m2 IV both D1,8,15; q21 days) Senna-Docusate Sodium  2 tablet Oral Daily   • Enoxaparin Sodium  40 mg Subcutaneous Nightly   • maalox/diphenhydramine/lidocaine viscous  5 mL Oral TID & HS   • azithromycin  500 mg Intravenous Q24H     • sodium chloride     • lactated ringers 125 mL/hr a 115/53 (02/02 0424)  Temp: 98.4 °F (36.9 °C) (02/02 0424)  Do Not Use - Resp Rate: --  SpO2: 96 % (02/02 0424)      Wt Readings from Last 6 Encounters:  02/01/18 : 51.1 kg (112 lb 11.2 oz)  02/01/18 : 50.8 kg (112 lb)  01/29/18 : 50.4 kg (111 lb 3.2 oz)  0 interstitial and airspace opacity involves the right upper lobe adjacent to the fissure. No pleural effusion or pneumothorax. Left Port-A-Cath tip at SVC/RA junction.   =====  CONCLUSION:    1.  Slight increased opacity involves the right upper lobe rohan chemotherapy  -continue oral hygiene, mouth rinses  -morphine and mucositis mixture as above     *dehydration, hyponatremia, hypokalemia  -IVF  -replace lytes       *constipation  -continue miralax, senna     *rash after chemo  -has had a couple different

## 2018-02-02 NOTE — PAYOR COMM NOTE
--------------  ADMISSION REVIEW     Payor: [address]  Subscriber #:  [id number]  Authorization Number: N/A    Admit date: 2/1/18  Admit time: 3459       Admitting Physician: Alyssa Huerta DO  Attending Physician:  Pierre Infante Comment: benign lumps removed from right breast and                left breast in early 1990s.        Allergies:   Doxycycline             Hives    Comment:Hives, joint pain  Minocycline             Hives    Comment:Hives, joint pain  Tetracyclines & Rel 102   Temp 101.3 °F (38.5 °C) (Oral)   Resp 20   Ht 157.5 cm (5' 2\")   Wt 112 lb 11.2 oz (51.1 kg)   SpO2 98%   BMI 20.61 kg/m²    General: No acute distress. Alert and oriented x 3. [MD.1]  Ill-appearing[MD.3]  HEENT: Normocephalic, atraumatic. EOM-I.  Ani consulted[MD.3]  9. Anemia 2/2 chemotherapy[MD.1]-monitor  10.  Elevated LFTs-monitor[MD.3]    Palliative care evaluation     Quality:  · DVT Prophylaxis:[MD.1] Lovenox[MD.3]  · CODE status:[MD.1] Full[MD.3]  · Cervantes:[MD.1] No[MD.3]  Plan of care discussed Joao Jorgensen RN      Piperacillin Sod-Tazobactam So (ZOSYN) 3.375 g in dextrose 5 % 100 mL ADD-vantage     Date Action Dose Route User    2/2/2018 0619 New Bag 3.375 g Intravenous Sammy Sainz RN    2/1/2018 2107 New Bag 3.375 g Intravenous [employer] No pleural effusion or pneumothorax. Left Port-A-Cath tip at SVC/RA   junction.     =====  CONCLUSION:    1. Slight increased opacity involves the right upper lobe concerning for infectious process given the history.

## 2018-02-02 NOTE — DIETARY MALNUTRITION NOTE
[facility]    NUTRITION INITIAL ASSESSMENT    Pt meets severe malnutrition criteria.     CRITERIA FOR MALNUTRITION DIAGNOSIS:  Criteria for severe malnutrition diagnosis: chronic illness related to wt loss greater than 5% in 1 month, energy intake less (112 lb)  01/29/18 : 50.4 kg (111 lb 3.2 oz)  01/26/18 : 52.6 kg (116 lb)  01/25/18 : 51.4 kg (113 lb 6.4 oz)  01/24/18 : 53.3 kg (117 lb 6.4 oz)  01/18/18 : 55 kg (121 lb 3.2 oz)  01/11/18 : 52.1 kg (114 lb 12.8 oz)  01/04/18 : 52.4 kg (115 lb 9.6 oz)  12

## 2018-02-02 NOTE — CM/SW NOTE
02/02/18 1500   CM/SW Referral Data   Referral Source Physician   Reason for Referral Discharge planning   Informant Patient   Pertinent Medical Hx   Primary Care Physician Name (Dr. Norris Yancey)   Patient Info   Patient's Mental Status Alert;Oriented   Giovani Ayon

## 2018-02-02 NOTE — PROGRESS NOTES
Temp 101.2. Attempting to eat dinner. Tylenol administered. Dr Lidia Vargas at bedside. On zosyn and zithromax. Pac accessed in cancer center. Lactic acids drawn x2-both WNL. Alert and orientated x4, forgetful. Very talkative despite mouth pain.   Taking o

## 2018-02-02 NOTE — PROGRESS NOTES
[facility]  Progress Note     Jazmin Beal Patient Status:  Inpatient    1965 MRN [id number]   [facility] 4NW-A Attending Kallie Fraga MD   Hosp Day # 1 PCP MIRIAN HERRING     Chief Complaint: rash and pain in mouth    S: Patient • Vitamin C  500 mg Oral Daily   • morphINE Sulfate ER  15 mg Oral Q12H   • PEG 3350  17 g Oral Daily   • piperacillin-tazobactam  3.375 g Intravenous Q8H   • Senna-Docusate Sodium  2 tablet Oral Daily   • Enoxaparin Sodium  40 mg Subcutaneous Nightly

## 2018-02-02 NOTE — PROGRESS NOTES
02/02/18 0423   Clinical Encounter Type   Visited With Patient  (Friend at bedside)   Routine Visit (Request for consult - Advance Directive)   Patient's Supportive Strategies/Resources Brief visit with pt. as she was concluding visit with friend.  Chapl

## 2018-02-02 NOTE — H&P
[facility]  History and Physical     Efraín Beal Patient Status:  Inpatient    1965 MRN [id number]   [facility] 4NW-A Attending Michael Cruz MD   Hosp Day # 0 PCP MIRIAN vieira  Chief Complaint: fever    History of Prese pain  Tetracyclines & Rel*    Hives    Comment:Hives, joint pain  Tree Nuts                 Medications:    No current facility-administered medications on file prior to encounter.    Current Outpatient Prescriptions on File Prior to Encounter:  levofloxaci atraumatic. EOM-I. Anicteric. Neck: No lymphadenopathy. No JVD. No carotid bruits. Respiratory: Good inspiratory effort. Coarse and right upper lobe. No rhonchi. Cardiovascular: S1, S2. Regular rhythm. Regular rate. No murmurs, rubs or gallops.  Equal p

## 2018-02-02 NOTE — PLAN OF CARE
Patient received this am slightly confused, reoriented easily-medication?, drowsy, dose reduced by Dr Genevieve Ochoa.  Lungs clear, diminished on room air, denies SOB, occasional cough, tessalon administered per patient request. Abdomen soft, bowel sounds present,

## 2018-02-02 NOTE — PALLIATIVE CARE NOTE
PC APN/[place] following this pt to assess for Palliative Care needs. PCSW will follow-up based on outcome of discussion with PC APN/[place]. PCSW advised Unit SW/Lisa of this.

## 2018-02-02 NOTE — PALLIATIVE CARE NOTE
LOBITO NICHOLSON/Abby advised PCSW that [facility] Team is signing off case as the plan is for patient to continue seeing outpatient LOBITO NICHOLSON/Dawn Cooper Schwab at d/c. This was reviewed with oncologist, so there is no need for [facility] team to see pt as inpatient.  Orders have been place

## 2018-02-03 NOTE — PROGRESS NOTES
[facility]    Progress Note    Efraín Beal Patient Status:  Inpatient    1965 MRN [id number]   [facility] 4SW-A Attending Tammy Snow MD   Hosp Day # 2 PCP MIRIAN HERRING     Subjective:  Efraín Beal is a(n) 50year old female wi Anemia associated with chemotherapy     Thrombocytopenia (HCC)     Hyperbilirubinemia     Fever in adult     Pneumonia of left lung due to infectious organism     Sepsis ([facility] [address]     Hyponatremia     Mucositis     Elevated liver enzymes     Rash     Constipat

## 2018-02-03 NOTE — PROGRESS NOTES
Called by RN d/t fever, hypotension and tachycardia  Patient with sepsis d/t pneumonia  BP 98/86 (BP Location: Right arm)   Pulse 127   Temp 100.1 °F (37.8 °C)   Resp 24   Ht 5' 2\" (1.575 m)   Wt 112 lb 11.2 oz (51.1 kg)   SpO2 93%   BMI 20.61 kg/m²   Sesar

## 2018-02-03 NOTE — CONSULTS
[facility]  Report of Consultation    Vicki Bardales Patient Status:  Inpatient    1965 MRN [id number]   [facility] 4SW-A Attending Chon Corrales MD   Hosp Day # 2 PCP Christian Draft     Reason for Consultation:  Hypotension with heal used smokeless tobacco. She reports that she drinks alcohol. She reports that she does not use drugs.     Allergies:    Doxycycline             Hives    Comment:Hives, joint pain  Minocycline             Hives    Comment:Hives, joint pain  Tetracyclines & R tablet (10 mg total) by mouth every 6 (six) hours as needed for Nausea.  Disp: 30 tablet Rfl: 3 More than a month at Unknown time       Review of Systems:    Constitutional: Believes her weight is been decreasing  Eyes: Denies any specific lesions or issues deformity. Heart: Tachycardic regular rate and rhythm distant tones   Abdomen: soft, non-tender, non-distended, no masses, no guarding, no     rebound.   No obvious ascites   Extremity: +2 pitting edema bilaterally with scattered rashes throughout the lar chemotherapy    Impression    1. Metastatic breast carcinoma status post chemo was intolerant of many with rashes mucositis weakness. 2.  Febrile illness clinically related to right-sided pneumonia/healthcare acquired respiratory status remains stable.

## 2018-02-03 NOTE — PLAN OF CARE
Patient received this am alert and oriented x 3-4, some confusion overnight per RN.  Lungs clear, diminished, slight expiratory wheeze left upper lobe, dry cough, tessalon gregoria administered per patient request, sats on room air 86%, oxygen applied per nasa

## 2018-02-03 NOTE — PROGRESS NOTES
[facility]  Progress Note     Wayne Somers Patient Status:  Inpatient    1965 MRN [id number]   [facility] 4NW-A Attending Allison Ahuja MD   Hosp Day # 2 PCP MIRIAN HERRING     Chief Complaint: Pneumonia     S: Patient  Feels very reviewed in Epic.     Medications:   • potassium chloride  40 mEq Intravenous Once    Followed by   • potassium chloride  20 mEq Intravenous Once   • sodium chloride 0.9%  500 mL Intravenous Once   • cholecalciferol  5,000 Units Oral Daily   • magnesium oxi

## 2018-02-03 NOTE — PROGRESS NOTES
[facility]      Sepsis Reassessment Note    BP 98/35   Pulse 86   Temp 98 °F (36.7 °C)   Resp 15   Ht 157.5 cm (5' 2\")   Wt 126 lb 12.2 oz (57.5 kg)   SpO2 97%   BMI 23.19 kg/m²      11:34 AM    Cardiac:  Regularity: Regular  Rate: Normal  Heart So

## 2018-02-03 NOTE — PLAN OF CARE
Pt alert and oriented x4. Pt with periods of confusion and forgetfulness. Fall precautions maintained and pt rounded on frequently through the night. Denies any bladder spasms overnight. No need for PRN pain medication through the night.   Up with assist

## 2018-02-04 NOTE — PROGRESS NOTES
Pt noted to have low sa02. NC to 6L. SA02 82-90%. ABG done. Mark Motley called with results. Ordered to increase to 10L High flow. Done per RT    1800 pt cont to have SOB. Notified Dr. Mark Motley. Changed to Vapotherm. Also started Precedex gtt.  Pt having increased a

## 2018-02-04 NOTE — PROGRESS NOTES
[facility]  Progress Note    Efraín Beal Patient Status:  Inpatient    1965 MRN [id number]   [facility] 4SW-A Attending Tammy Snow MD   Hosp Day # 3 PCP MIRIAN HERRING     Subjective:  Efraín Beal is a(n) 50year old female temper --  133*  --  140   K 3.1* 2.8* 3.7 2.8* 3.2* 4.3  4.3   CL 94* 101  --  100*  --  113*   CO2 24.0 25.0  --  23.0  --  21.0*   BUN 12 7*  --  6*  --  6*   CREATSERUM 0.48* 0.29*  --  0.36*  --  0.28*     No results for input(s): PTP, INR, PTT in the last 7 Anemia post chemo with plans to follow for need for transfusion      Ongoing cultures as able  Speech efforts appreciated with plans for follow-up  Will discuss with infectious disease service  May need to consider bronchoscopy if not improving.   Discussed

## 2018-02-04 NOTE — SLP NOTE
Order received for swallow eval.  Attempted earlier today and pt occupied with bathroom needs. Re-attempted 1300 however pt occupied with dermatologist/biopsy at bedside.     Per pulmonologist, pt has noted some difficulty swallowing with coughing while ea

## 2018-02-04 NOTE — PROGRESS NOTES
02/04/18 1453   Clinical Encounter Type   Visited With Patient and family together  (Pt.'s sister at bedside)   Routine Visit (Responded to consult for Advance Directive)   Continue Visiting No   Patient's Supportive Strategies/Resources  offere

## 2018-02-04 NOTE — CONSULTS
[facility]   Dermatology consultation    Cristobal Ortiz Patient Status:  Inpatient    1965 MRN [id number]   [facility] 4SW-A Attending Eva Zimmemran MD   Hosp Day # 3 PCP Aliza Betancur     Reason for Consultation:   To evaluate a rash o breast cancer ([facility] [address] 12/18/2017     Past Surgical History:  No date: BREAST LUMPECTOMY      Comment: benign lumps removed from right breast and                left breast in early 1990s.    Family History   Problem Relation Age of Onset   • Cancer Father 79 Daily  •  Enoxaparin Sodium (LOVENOX) 40 MG/0.4ML injection 40 mg, 40 mg, Subcutaneous, Nightly  •  diphenhydrAMINE (BENADRYL) cap/tab 25 mg, 25 mg, Oral, Q6H PRN  •  maalox/diphenhydramine/lidocaine viscous (STOMATITIS COCKTAIL) suspension 5 mL, 5 mL, Ora palpable lymphadenopathy throughout in the cervical,            supraclavicular, axillary, or inguinal regions.   Skin: Scattered on her lower legs but particularly on her dorsal feet, knees, and hips there were erythematous macular patches with poorly defi chemotherapy     Pneumonia of left upper lobe due to infectious organism [facility])    Assessment:  1. The mucositis may be related to her previous chemotherapy.   2.  The erythematous blistering eruption on her extremities is most likely drug-induced although left hip under local anesthesia and the wounds were closed with 4-0 nylon simple sutures. Suture removal in 1 week. The wounds were dressed with white petrolatum and a bandage.   2.  Based on the above information, I suggest avoiding the use of the taxane

## 2018-02-04 NOTE — PLAN OF CARE
CARDIOVASCULAR - ADULT    • Maintains optimal cardiac output and hemodynamic stability Not Progressing    • Absence of cardiac arrhythmias or at baseline Not Progressing          PAIN - ADULT    • Verbalizes/displays adequate comfort level or patient's sta

## 2018-02-04 NOTE — PROGRESS NOTES
[facility]    Progress Note    Trina Garay Patient Status:  Inpatient    1965 MRN [id number]   [facility] 4SW-A Attending Brady Ballard MD   Hosp Day # 3 PCP MIRIAN HERRING     Subjective:  Trina Garay is a(n) 50year old female wi chemotherapy     Thrombocytopenia (HCC)     Hyperbilirubinemia     Fever in adult     Pneumonia of left lung due to infectious organism     Sepsis ([facility] [address]     Hyponatremia     Mucositis     Elevated liver enzymes     Rash     Constipation     Cancer associat

## 2018-02-04 NOTE — PROGRESS NOTES
[facility]  Progress Note     Vicki Bardales Patient Status:  Inpatient    1965 MRN [id number]   [facility] 4NW-A Attending Chon Corrales MD   Hosp Day # 3 PCP MIRIAN HERRING     Chief Complaint: Pneumonia     S: Patient feels less w TP  4.4*   --   4.2*   --   3.8*    < > = values in this interval not displayed. Estimated Creatinine Clearance: 190.1 mL/min (based on SCr of 0.28 mg/dL (L)). No results for input(s): PTP, INR in the last 72 hours.     No results for input(s): T

## 2018-02-04 NOTE — PLAN OF CARE
CARDIOVASCULAR - ADULT    • Maintains optimal cardiac output and hemodynamic stability Not Progressing        RESPIRATORY - ADULT    • Achieves optimal ventilation and oxygenation Not Progressing          PAIN - ADULT    • Verbalizes/displays adequate comf

## 2018-02-05 PROBLEM — C50.919 BREAST CANCER (HCC): Status: ACTIVE | Noted: 2018-01-01

## 2018-02-05 NOTE — PROGRESS NOTES
Pt having increased respiratory distress and was placed on Vapotherm prior to my arrival by Dr. Virginei Valera. Per notes she was more agitated and anxious so was started on Precedex gtt. She is breathing easier but is still tachypneic.   Saturations are above 96% Dr. Jaun Nava notified and she would like Primary care Hospitalist to also discuss this decision with the patient and family. Dr. Rei Napoles spoke to patient and family and confirmed their decision for comfort care.

## 2018-02-05 NOTE — SLP NOTE
Pt transitioning to comfort care measures at this time. Will sign off. Thank you.     Johnny Hernandez M.S. [id number] [facility]  Pager [phone]

## 2018-02-05 NOTE — PROGRESS NOTES
[facility]  Progress Note    Naheedcarey Buckley Patient Status:  Inpatient    1965 MRN [id number]   [facility] 4SW-A Attending Anne Marie Kincaid MD   Hosp Day # 4 PCP MIRIAN HERRING     STATUS UPDATE: Over the course of the last 12-18 hours, the anteriorly without wheezes or crackles. Chest wall: No tenderness or deformity. Heart: Regular rate and rhythm, sinus tachycardia (presumably due to hypoxia), normal S1S2, no murmur.    Abdomen: Soft, non-tender, non-distended, no masses, no guarding, n  Anemia post chemo with plans to follow for need for transfusion        The patient has elected to pursue comfort care and hospice. Her family is supportive of that decision.   Although this may not be the course most of us would recommend because of her y

## 2018-02-05 NOTE — PROGRESS NOTES
02/05/18 0124   Clinical Encounter Type   Visited With Patient and family together   Continue Visiting Yes   Crisis Visit Patient actively dying   Referral From Family   Referral To [address] Needs Prayer   Sacramental

## 2018-02-05 NOTE — HOSPICE RN NOTE
Jason Mcintosh met with this patients family and obtained consents for hospice. Plan to admit to [facility] with GIP level of care for management of pain. Dx metastatic breast cancer.  Discussed the above with MARVA Pollard, Dr. Bernardo Gill, Dr. Hali Shelby from

## 2018-02-05 NOTE — PROGRESS NOTES
Hematology/Oncology Progress Note    Patient Name: Jovana Sommers  Medical Record Number: [id number]    YOB: 1965     Reason for Consultation:  Jovana Sommers was seen today for the diagnosis of metastatic breast cancer    Interval events:  Pt has d coarse crackles.    Ext: +BLE edema    Laboratory:  Recent Labs   Lab  02/03/18   0607  02/04/18   0435   WBC  23.3*  23.6*   HGB  7.5*  7.5*   HCT  21.1*  21.1*   PLT  219.0  201.0   MCV  86.1  85.1   RDW  19.3*  20.4*   NEPRELIM  14.26*  13.97*       Rece visit her today     *hypoxia, PNA, diffuse airspace disease  -pt declined bronchoscopy for further work up  -supp O2 for comfort per pt wishes    *pain- due to malignancy (breast, liver, pelvic bone), mucositis  -continue morphine for pain control    *muco

## 2018-02-05 NOTE — PROGRESS NOTES
02/05/18 1420   Clinical Encounter Type   Visited With Patient and family together   Sacramental Encounters   Sacrament of Sick-Anointing (Father Frederick Silver provided prayer.  Scripture, support and Sacrament of the Sick. )

## 2018-02-05 NOTE — PROGRESS NOTES
Received in a lot of abdominal pain at the start of shift. Very anxious. Precedex titrated. Took the sched MS Contin around 6PM. Only on PRN PO morphine. Not given yet, pt has difficulty swallowing; choking even on sips of water. Kept NPO, for swallow eval

## 2018-02-05 NOTE — RESPIRATORY THERAPY NOTE
Around 1655 rn called pt desats to low 80's,did ABGs:7.44/26/48/17.7,place pt on HFNC with 10L per pulm. MD sats 89-92%,will monitor pt closely.

## 2018-02-05 NOTE — CONSULTS
[facility] dosing service    Initial Pharmacokinetic Consult for Vancomycin Dosing     Xu Meyer is a 50year old female admitted on 2/1/18 who is being treated for pneumonia.   Pharmacy has been asked to dose Vancomycin by Dr. Jordan Crew    She is allergic Culture Result No Growth 1 Day N/A       Radiology:2/4 CXR: diffuse airspace disease not significantly changed    Based on the above:    1.  This patient will receive a loading dose of Vancomycin  1.5 gm IVPB (25mg/kg, capped at 2g) x 1 dose, followed by 1

## 2018-02-05 NOTE — PROGRESS NOTES
[facility]  Progress Note     Gaston Gordon Patient Status:  Inpatient    1965 MRN [id number]   [facility] 4SW-A Attending Willy Trujillo MD   Hosp Day # 4 PCP MIRIAN HERRING     Chief Complaint: les edema    S: Patient is drowsy, she cancer- patient decided to go for hospice care, d/w family at the bedside  2. Adverse effect from chemotherapy- rash  3. Mucositis   4. Breast ca with mets to liver/ bone   5.  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 ELevated LFT - likely from liver mets / hypote

## 2018-02-05 NOTE — CM/SW NOTE
Called by RN to arrange for hospice referral ASAP. Per RN, working to obtain pain relief on morphine drip. Cheo Just currently. Spoke with pt's sister and mother at ICU bedside. Will make [facility] referral as RN anticipates hospital death.     Plan:

## 2018-02-06 NOTE — HOSPICE RN NOTE
GIP day #2- Patient unresponsive, appearing comfortable on morphine drip at 6mg/hr and precedex at 0.7mcg/hr. Scopolamine patch in place. O2 @ 5lpm by nasal cannula. Many family members at bedside. O2 sats in 60s, hr 81, BP low at 62/28 this morning.  T 96

## 2018-02-06 NOTE — PLAN OF CARE
Received patient this am unresponsive. Precedex and Morphine drips infusing. Patient appears comfortable. Respirations shallow, but non-labored. Lungs sound dim/rhonchi throughout. Family at bedside. Emotional support provided to patient and family.

## 2018-02-06 NOTE — PROGRESS NOTES
Hematology/Oncology Progress Note    Patient Name: Beth Douglas  Medical Record Number: [id number]    YOB: 1965     Reason for Consultation:  Beth Douglas was seen today for the diagnosis of metastatic breast cancer    Interval events:  Full tra started per her wishes. She appears comfortable.   Inhospital hospice care ongoing as hospital death expected     *pain- due to malignancy (breast, liver, pelvic bone), mucositis  -continue morphine for pain control    *mucositis  -due to chemotherapy  -mo

## 2018-02-06 NOTE — PROGRESS NOTES
Remained comfortable the whole night with Morphine drip at 6 mg/hr and precedex at 0.7 mcg/hr. Cervantes to bag with tea colored urine. Turned to sides. Kept comfortable  Attended to family`s needs. Provided emotional support.    Endorsed to AM MARVA richardson

## 2018-02-06 NOTE — PROGRESS NOTES
[facility]  Progress Note     Wayne Somers Patient Status:  Inpatient    1965 MRN [id number]   [facility] 4SW-A Attending Shannan Aldridge MD   Hosp Day # 1 PCP MIRIAN HERRING     Chief Complaint: sob rash    S: Patient is under comfort liver/ bone   4.  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

## 2018-02-06 NOTE — PLAN OF CARE
Late entry- prior to hospice readmit- Pt care was assumed at 0730- she was awake alert and tachypneic with RR 40s, morphine drip at 2 mg/hr. She was on HF NC at 10 liters. She reported she was in pain.  Dr Yue Valentino rounded and comfort measures were discus

## 2018-02-07 NOTE — HOSPICE RN NOTE
GIP day 3- patient assessed in bed, death appears imminent. Respirations very shallow but non labored today. Morphine drip increased to 9mg/hr. Precedex drip at 1mcg/kg/hr. Both of these appear to be effective in keeping patient comfortable.  No non-verba

## 2018-02-07 NOTE — CM/SW NOTE
ODILIA granados 20. MSW and RN RN made join visit to provide support at pt bedside. Many family members were present, all supportive of the pt and each other.  Home will be [facility] in [place] [facility] [place], note put on communication page.   ODILIA Vo  Res

## 2018-02-07 NOTE — PLAN OF CARE
Problem: DEATH & DYING  Goal: Pt/Family communicate acceptance of impending death and feel psychological comfort and peace  INTERVENTIONS:  - Assess patient/family anxiety and grief process related to end of life issues  - Provide emotional and spiritual s

## 2018-02-07 NOTE — DISCHARGE SUMMARY
[facility] [place] [facility]  DISCHARGE SUMMARY     Cristobal Ortiz Patient Status:  Inpatient    1965 MRN [id number]   [facility] 4SW-A Attending No att. providers found   Hosp Day # 4 PCP MIRIAN HERRING     Date of Admission: 2018  Date of Dischar STOP taking these medications    benzonatate 100 MG Caps  Commonly known as:  TESSALON        levofloxacin 500 MG Tabs  Commonly known as:  LEVAQUIN        maalox/diphenhydramine/lidocaine viscous Susp  Commonly known as:  STOMATITIS COCKTAIL        m

## 2018-02-07 NOTE — PLAN OF CARE
DEATH & DYING    • Pt/Family communicate acceptance of impending death and feel psychological comfort and peace Progressing        Received pt at 0730, family at bedside. Fresh water and a bearevment tray ordered for family.  Morphine infusing at 9mg hour x

## 2018-02-07 NOTE — PROGRESS NOTES
Hematology/Oncology Progress Note    Patient Name: Krishna Rangel  Medical Record Number: [id number]    YOB: 1965     Reason for Consultation:  Krishna Rangel was seen today for the diagnosis of metastatic breast cancer    Interval events:  Has been hospice care ongoing as hospital death expected soon. *pain- due to malignancy (breast, liver, pelvic bone), mucositis  -continue morphine for pain control    *rash after chemo  -s/p bx pending- mother of patient inquiring of path results.   I will noti

## 2018-02-07 NOTE — PROGRESS NOTES
[facility]  Progress Note     Gabo Mosher Patient Status:  Inpatient    1965 MRN [id number]   [facility] 4SW-A Attending Reva Puente MD   Hosp Day # 2 PCP MIRIAN HERRING     Chief Complaint: sob rash    S: Patient resting, on morp discharge to:  tbd      Plan of care discussed with RN, patient's family.     Zainab Huerta MD

## 2018-02-08 NOTE — HOSPICE RN NOTE
GIP day 4- Patient assessed in bed, she continues to be unresponsive. Appears to be resting comfortably at this time on Morphine drip @ 10mg/hr. Precedex at 1.5mcg/kg/hr. Had an episode of severe discomfort this morning and had been crying out loudly.  A

## 2018-02-08 NOTE — PLAN OF CARE
DEATH & DYING    • Pt/Family communicate acceptance of impending death and feel psychological comfort and peace Completed          Pt received on 2L nc for comfort. precedex and morphine drip infusing  Pt unresponsive.  Appears comfortable  Family at bedsid

## 2018-02-08 NOTE — PROGRESS NOTES
Hematology/Oncology Progress Note    Patient Name: Brooklyn Faith  Medical Record Number: [id number]    YOB: 1965     Reason for Consultation:  Brooklyn Faith was seen today for the diagnosis of metastatic breast cancer    Interval events:  Remains -discussed with family who have concerns that her death is being prolonged by addition of supp O2, they voiced that she would not want to prolong this process unless needed for comfort, therefore they are agreeable to stopping supp O2 so long as she caty

## 2018-02-08 NOTE — PROGRESS NOTES
[facility]  Progress Note     Hassie Failing Patient Status:  Inpatient    1965 MRN [id number]   [facility] 4SW-A Attending Lisa Kate MD   Hosp Day # 3 PCP MIRIAN HERRING     Chief Complaint: sob rash    S: Patient resting, on morp discharge to:  tbd      Plan of care discussed with RN, patient's family.     Elly Patel MD

## 2018-02-09 NOTE — PLAN OF CARE
DEATH & DYING    • Pt/Family communicate acceptance of impending death and feel psychological comfort and peace Progressing        0330H Episode of moaning and crying, Morphine drip increased to 15 mg/hr, gave Ativan 1 mg IVP for agitation.  Gave another do

## 2018-02-09 NOTE — HOSPICE RN NOTE
Hospice inpatient visit    GIP day 5    Received patient in bed, multiple family members present at bedside. Patient is unresponsive, appears to be comfortable at this time, morphine drip is currently at 15mg/hr which is maximum for current order.   Nick Soto

## 2018-02-09 NOTE — PROGRESS NOTES
[facility]  Progress Note     Beth Douglas Patient Status:  Inpatient    1965 MRN [id number]   [facility] 4SW-A Attending Farzana Engle MD   Hosp Day # 4 PCP MIRIAN HERRING     Chief Complaint: sob rash    S: Patient resting, now on this point Ms. Fortino Christian is expected to be discharge to:  tbd      Plan of care discussed with RN, patient's family.     Victoriano Heredia MD

## 2018-02-09 NOTE — PLAN OF CARE
DEATH & DYING    • Pt/Family communicate acceptance of impending death and feel psychological comfort and peace Progressing        1930H Inpatient hospice care, on comfort care.  Received unresponsive, slow shallow breathing, HR 60 -70's/min, O2 sat 60's, o

## 2018-02-09 NOTE — PLAN OF CARE
Pt has remained comfortable on ativan 1 mg/hr, precedex 1.5, morphine increased per prn for comfort to 16 mg/hr. Family continues to decline repositioning. She is weeping from her bilateral lower extremities, oral care completed.  Per family secretions have

## 2018-02-09 NOTE — PLAN OF CARE
Pt care assumed this am. Remains on inpatient hospice, family at bedside, medicated for comfort. See emar. Family refused repositioning at present. Emotional support provided.  Continue comfort measures

## 2018-02-09 NOTE — PROGRESS NOTES
Hematology/Oncology Progress Note    Patient Name: Xu Meyer  Medical Record Number: [id number]    YOB: 1965     Reason for Consultation:  Xu Meyer was seen today for the diagnosis of metastatic breast cancer    Interval events:  Remains further treatment, wanting to focus only on comfort and hospice which ongoing per her wishes. She appears comfortable. Inpatient hospice care ongoing as hospital death expected soon and cannot be safely or comfortably transported.      *pain- due to [facility]

## 2018-02-10 NOTE — PLAN OF CARE
DEATH & DYING    • Pt/Family communicate acceptance of impending death and feel psychological comfort and peace Progressing        Still inpatient hospice, comfort care, family at the bedside, appears comfortable.  Precedex drip maintained at maximim dose,

## 2018-02-10 NOTE — PROGRESS NOTES
[facility]  Progress Note     Jovana Sommers Patient Status:  Inpatient    1965 MRN [id number]   [facility] 4SW-A Attending Viri Pope MD   Hosp Day # 5 PCP MIRIAN HERRING     Chief Complaint: sob rash    S: Patient resting, now on is expected to be discharge to:  tbd      Plan of care discussed with RN, patient's family.     Linnette Stovall MD

## 2018-02-10 NOTE — HOSPICE RN NOTE
Day 6 general inpatient hospice. Death continues to appear imminent- RR 10-11/minute/irregular/non labored. Os Sats in 40s on room air. .Skin breaking down. She continues to make urine. Edema to BLE/BUE.  Morphine drip at 18mg/hr, ativan drip at 1mg/hr, p

## 2018-02-10 NOTE — PLAN OF CARE
Pt remains with agonal breathing, periodic episodes of audible rhonchi, medications titrated for comfort, family at bedside. Morphine drip adjusted to 20 mg/hr and ativan infusion adjusted to 2 mg/hr for comfort.  Oral care and positioning completed as pt

## 2018-02-10 NOTE — PLAN OF CARE
Pt care assumed this am. Family remains at bedside. Pt was able to be repositioned with assist of night nurse, and emotional support provided by both RNs to family. Pt noted to have multiple new areas of skin breakdown and deep tissue injury.   Moderate siz

## 2018-02-10 NOTE — PROGRESS NOTES
The pathology results from the light microscopy skin biopsy that the rash was consistent with a bullous adverse drug eruption were discussed with the patient's family. The direct immunofluorescence is still pending.   Clinically most of the erythema from t

## 2018-02-11 NOTE — PLAN OF CARE
DEATH & DYING    • Pt/Family communicate acceptance of impending death and feel psychological comfort and peace Progressing          Pt unresponsive on morphine, ativan and precedex drips.    Room air with agonal breathing and occasional apneic episodes  Ro

## 2018-02-11 NOTE — PROGRESS NOTES
[facility]  Progress Note     Kevyn Graham Patient Status:  Inpatient    1965 MRN [id number]   [facility] 4SW-A Attending Raven Iverson MD   Hosp Day # 6 PCP MIRIAN HERRING     Chief Complaint: sob rash    S: Patient resting, now on date of discharge: tbd  Discharge is dependent on: clinical   At this point Ms. Feli Valdez is expected to be discharge to:  tbd      Plan of care discussed with RN, patient's family.     Zainab Huerta MD

## 2018-02-11 NOTE — HOSPICE RN NOTE
Patient passed away during my visit today.  TOD 1235pm. Assisted with completion of Notification of Death checklist.     Darya Navarro, [facility] Liaison

## 2018-02-12 NOTE — DISCHARGE SUMMARY
[facility]  DISCHARGE SUMMARY     Natalie Sal Patient Status:  Inpatient    1965 MRN [id number]   [facility] 4SW-A Attending No att. providers found   Hosp Day # 6 PCP MIRIAN HERRING     Date of Admission: 2018  Date of Dischar dehydration. Admitted to the ICU and was treated for sepsis and pneumonia. She is status post chemotherapy for her metastatic breast CA. Chemotherapy was poorly tolerated.   Patient decided against any further treatment and  desire to focus on comfort an

## 2019-04-03 NOTE — PROGRESS NOTES
"Pharmacy Chemotherapy Verification  Patient Name: Ani Parham  Dx: Malignant neoplasm of ovary    Cycle 5  Previous treatment = 3/13/2019    Regimen: CARBOplatin + Taxol  PACLitaxel 175 mg/m² IV over 3 hrs on Day 1  CARBOplatin AUC 5-6 IV over 30 min on Day 1  21-day cycle for 3-6 cycles (neoadjuvant or stage I) or 6-8 cycles (stages II-IV)  NCCN Guidelines for Ovarian Cancer/Fallopian Tube Cancer/Primary Peritoneal Cancer V.1.2016  Radha RF, et al. J Clin OncoI. 2003;21(17:3194-200)    Allergies:  Fluconazole    /90   Pulse 96   Temp 37 °C (98.6 °F) (Temporal)   Resp 18   Ht 1.651 m (5' 5\")   Wt 60.5 kg (133 lb 6.1 oz)   SpO2 97%   BMI 22.20 kg/m²  Body surface area is 1.67 meters squared.    Labs 4/1/19 ([facility])  ANC 1600 Hgb 11.4 Plt 166k  SCr 0.77 CrCl 77.4 mL/min AST/ALT/AP  = 24/24/52 Tbili = 0.3  Mg 1.8  K 4    PACLitaxel (Taxol) 175 mg/m² x 1.67 m² = 292.25 mg   <5% difference, okay to treat with final dose = 287 mg IV    CARBOplatin (Paraplatin) AUC 6 (77.4 mL/min + 25) = 614.4 mg   <5% difference, OK to treat with final dose = 643 mg IV    Katie Garcia, PharmD, BCOP    " Patient here for D15C1 Taxol only. See toxicities. Reviewed next appts and MD instructions for dex/benadryl.      Education Record    Learner:  Patient and Family Member    Disease / Diagnosis: breast/bone    Barriers / Limitations:  None   Comments:    Met

## 2020-07-31 NOTE — PLAN OF CARE
Assumed care of patient at 0700. Patient unresponsive with multiple family members at bedside. O2 removed per family wishes and physician recommendation. Patient crying out in AM, PRN IVP ativan and bolus Morphine given to achieve comfort.  Patient resting [All Other ROS] : all other reviewed systems are negative

## (undated) NOTE — LETTER
Printed: 2018    Patient Name: Alis Panda  : 1965   Medical Record #: [id number]    Consent to Chemotherapy    I, Alis Panda, understand that I have been diagnosed with Metastatic Breast Cancer.     I understand that the treatment suggested by

## (undated) NOTE — LETTER
Printed: 2018    Patient Name: Lorie Sutton  : 1965   Medical Record #: [id number]    Consent to Chemotherapy    I, Lorie Sutton, understand that I have been diagnosed with Metastatic breast .    I understand that the treatment suggested by my do